# Patient Record
Sex: MALE | Race: WHITE | NOT HISPANIC OR LATINO | Employment: PART TIME | URBAN - METROPOLITAN AREA
[De-identification: names, ages, dates, MRNs, and addresses within clinical notes are randomized per-mention and may not be internally consistent; named-entity substitution may affect disease eponyms.]

---

## 2024-05-11 ENCOUNTER — HOSPITAL ENCOUNTER (EMERGENCY)
Facility: MEDICAL CENTER | Age: 19
End: 2024-05-11
Attending: EMERGENCY MEDICINE
Payer: COMMERCIAL

## 2024-05-11 ENCOUNTER — APPOINTMENT (OUTPATIENT)
Dept: RADIOLOGY | Facility: MEDICAL CENTER | Age: 19
End: 2024-05-11
Attending: EMERGENCY MEDICINE

## 2024-05-11 VITALS
TEMPERATURE: 97.2 F | SYSTOLIC BLOOD PRESSURE: 142 MMHG | WEIGHT: 253.09 LBS | RESPIRATION RATE: 18 BRPM | OXYGEN SATURATION: 98 % | DIASTOLIC BLOOD PRESSURE: 71 MMHG | HEART RATE: 76 BPM

## 2024-05-11 DIAGNOSIS — J06.9 UPPER RESPIRATORY TRACT INFECTION, UNSPECIFIED TYPE: ICD-10-CM

## 2024-05-11 DIAGNOSIS — R06.02 SOB (SHORTNESS OF BREATH): ICD-10-CM

## 2024-05-11 LAB
ALBUMIN SERPL BCP-MCNC: 4.5 G/DL (ref 3.2–4.9)
ALBUMIN/GLOB SERPL: 1.6 G/DL
ALP SERPL-CCNC: 78 U/L (ref 30–99)
ALT SERPL-CCNC: 34 U/L (ref 2–50)
ANION GAP SERPL CALC-SCNC: 13 MMOL/L (ref 7–16)
AST SERPL-CCNC: 23 U/L (ref 12–45)
BASOPHILS # BLD AUTO: 0.5 % (ref 0–1.8)
BASOPHILS # BLD: 0.04 K/UL (ref 0–0.12)
BILIRUB SERPL-MCNC: <0.2 MG/DL (ref 0.1–1.5)
BUN SERPL-MCNC: 10 MG/DL (ref 8–22)
CALCIUM ALBUM COR SERPL-MCNC: 9.5 MG/DL (ref 8.5–10.5)
CALCIUM SERPL-MCNC: 9.9 MG/DL (ref 8.5–10.5)
CHLORIDE SERPL-SCNC: 106 MMOL/L (ref 96–112)
CO2 SERPL-SCNC: 21 MMOL/L (ref 20–33)
CREAT SERPL-MCNC: 0.58 MG/DL (ref 0.5–1.4)
D DIMER PPP IA.FEU-MCNC: <0.27 UG/ML (FEU) (ref 0–0.5)
EKG IMPRESSION: NORMAL
EOSINOPHIL # BLD AUTO: 0.09 K/UL (ref 0–0.51)
EOSINOPHIL NFR BLD: 1.1 % (ref 0–6.9)
ERYTHROCYTE [DISTWIDTH] IN BLOOD BY AUTOMATED COUNT: 39.5 FL (ref 35.9–50)
FLUAV RNA SPEC QL NAA+PROBE: NEGATIVE
FLUBV RNA SPEC QL NAA+PROBE: NEGATIVE
GFR SERPLBLD CREATININE-BSD FMLA CKD-EPI: 143 ML/MIN/1.73 M 2
GLOBULIN SER CALC-MCNC: 2.8 G/DL (ref 1.9–3.5)
GLUCOSE SERPL-MCNC: 106 MG/DL (ref 65–99)
HCT VFR BLD AUTO: 44.8 % (ref 42–52)
HGB BLD-MCNC: 15.1 G/DL (ref 14–18)
IMM GRANULOCYTES # BLD AUTO: 0.05 K/UL (ref 0–0.11)
IMM GRANULOCYTES NFR BLD AUTO: 0.6 % (ref 0–0.9)
LYMPHOCYTES # BLD AUTO: 2.42 K/UL (ref 1–4.8)
LYMPHOCYTES NFR BLD: 30.7 % (ref 22–41)
MAGNESIUM SERPL-MCNC: 1.7 MG/DL (ref 1.5–2.5)
MCH RBC QN AUTO: 30.1 PG (ref 27–33)
MCHC RBC AUTO-ENTMCNC: 33.7 G/DL (ref 32.3–36.5)
MCV RBC AUTO: 89.4 FL (ref 81.4–97.8)
MONOCYTES # BLD AUTO: 0.6 K/UL (ref 0–0.85)
MONOCYTES NFR BLD AUTO: 7.6 % (ref 0–13.4)
NEUTROPHILS # BLD AUTO: 4.67 K/UL (ref 1.82–7.42)
NEUTROPHILS NFR BLD: 59.5 % (ref 44–72)
NRBC # BLD AUTO: 0 K/UL
NRBC BLD-RTO: 0 /100 WBC (ref 0–0.2)
PLATELET # BLD AUTO: 338 K/UL (ref 164–446)
PMV BLD AUTO: 9.8 FL (ref 9–12.9)
POTASSIUM SERPL-SCNC: 4.1 MMOL/L (ref 3.6–5.5)
PROCALCITONIN SERPL-MCNC: <0.05 NG/ML
PROT SERPL-MCNC: 7.3 G/DL (ref 6–8.2)
RBC # BLD AUTO: 5.01 M/UL (ref 4.7–6.1)
RSV RNA SPEC QL NAA+PROBE: NEGATIVE
SARS-COV-2 RNA RESP QL NAA+PROBE: NOTDETECTED
SODIUM SERPL-SCNC: 140 MMOL/L (ref 135–145)
WBC # BLD AUTO: 7.9 K/UL (ref 4.8–10.8)

## 2024-05-11 PROCEDURE — 83735 ASSAY OF MAGNESIUM: CPT

## 2024-05-11 PROCEDURE — 85379 FIBRIN DEGRADATION QUANT: CPT

## 2024-05-11 PROCEDURE — 71045 X-RAY EXAM CHEST 1 VIEW: CPT

## 2024-05-11 PROCEDURE — 36415 COLL VENOUS BLD VENIPUNCTURE: CPT

## 2024-05-11 PROCEDURE — 93005 ELECTROCARDIOGRAM TRACING: CPT

## 2024-05-11 PROCEDURE — 93005 ELECTROCARDIOGRAM TRACING: CPT | Performed by: EMERGENCY MEDICINE

## 2024-05-11 PROCEDURE — 80053 COMPREHEN METABOLIC PANEL: CPT

## 2024-05-11 PROCEDURE — 0241U HCHG SARS-COV-2 COVID-19 NFCT DS RESP RNA 4 TRGT ED POC: CPT

## 2024-05-11 PROCEDURE — 85025 COMPLETE CBC W/AUTO DIFF WBC: CPT

## 2024-05-11 PROCEDURE — 99284 EMERGENCY DEPT VISIT MOD MDM: CPT

## 2024-05-11 PROCEDURE — 84145 PROCALCITONIN (PCT): CPT

## 2024-05-11 PROCEDURE — 700105 HCHG RX REV CODE 258: Performed by: EMERGENCY MEDICINE

## 2024-05-11 RX ORDER — SODIUM CHLORIDE, SODIUM LACTATE, POTASSIUM CHLORIDE, CALCIUM CHLORIDE 600; 310; 30; 20 MG/100ML; MG/100ML; MG/100ML; MG/100ML
1000 INJECTION, SOLUTION INTRAVENOUS ONCE
Status: COMPLETED | OUTPATIENT
Start: 2024-05-11 | End: 2024-05-11

## 2024-05-11 RX ADMIN — SODIUM CHLORIDE, POTASSIUM CHLORIDE, SODIUM LACTATE AND CALCIUM CHLORIDE 1000 ML: 600; 310; 30; 20 INJECTION, SOLUTION INTRAVENOUS at 17:09

## 2024-05-11 ASSESSMENT — PAIN DESCRIPTION - PAIN TYPE
TYPE: ACUTE PAIN
TYPE: ACUTE PAIN

## 2024-05-11 NOTE — ED NOTES
Patient Identifiers verified; patient ambulated to room with a steady gait; accompanied by family; charted the Patient for an Emergency Room Physician to see.

## 2024-05-11 NOTE — ED PROVIDER NOTES
"ED Provider Note    CHIEF COMPLAINT  Chief Complaint   Patient presents with    Flu Like Symptoms     X 1 month.  Pt reports he took covid test 4 weeks ago but was negative    Shortness of Breath     Pt reports he is experiencing worsening SOB and \"getting more dizzy\" x 1 week       EXTERNAL RECORDS REVIEWED  Other none available    HPI/ROS  LIMITATION TO HISTORY   Select: : None  OUTSIDE HISTORIAN(S):  none    Montana Bone is a 19 y.o. male who presents with cough and shortness of breath.  Patient reports initially his symptoms began around 1 month ago, seem to wax and wane but has seemed worse over the last week.  He reports cough has been productive of some yellow sputum, there is some occasional blood.  He does report some intermittent left-sided chest pain with a cough, no constant chest pain, and does not seem to be exertional or positional.  He reports no leg pain or swelling.  No fevers or chills.  He also reports some occasional nausea and diarrhea.  No abdominal pain, no urinary symptoms.  No recent travel.  No syncope, no headaches, no rashes    PAST MEDICAL HISTORY       SURGICAL HISTORY  patient denies any surgical history    FAMILY HISTORY  History reviewed. No pertinent family history.    SOCIAL HISTORY  Social History     Tobacco Use    Smoking status: Every Day     Types: Cigarettes    Smokeless tobacco: Not on file   Vaping Use    Vaping Use: Never used   Substance and Sexual Activity    Alcohol use: Never    Drug use: Never    Sexual activity: Not on file       CURRENT MEDICATIONS  Home Medications    **Home medications have not yet been reviewed for this encounter**         ALLERGIES  No Known Allergies    PHYSICAL EXAM  VITAL SIGNS: BP (!) 142/71   Pulse 76   Temp 36.2 °C (97.2 °F) (Temporal)   Resp 18   Wt 115 kg (253 lb 1.4 oz)   SpO2 98%      Pulse ox interpretation: I interpret this pulse ox as normal.  Constitutional: Alert in no apparent distress.  HENT: No signs of " trauma, Bilateral external ears normal, Nose normal.   Eyes: Pupils are equal and reactive, Conjunctiva normal, Non-icteric.   Neck: Normal range of motion, No tenderness, Supple, No stridor.   Cardiovascular: Regular rate and rhythm, no murmurs.   Thorax & Lungs: Normal breath sounds, No respiratory distress, No wheezing, left chest wall  Abdomen: Bowel sounds normal, Soft, No tenderness, No masses, No pulsatile masses. No peritoneal signs.  Skin: Warm, Dry, No erythema, No rash.   Back: No bony tenderness, No CVA tenderness.   Extremities: Intact distal pulses, No edema, No tenderness, No cyanosis,  Negative Debora's sign.   Musculoskeletal: Good range of motion in all major joints. No tenderness to palpation or major deformities noted.   Neurologic: Alert , Normal motor function, Normal sensory function, No focal deficits noted.   Psychiatric: Affect normal, Judgment normal, Mood normal.               EKG/LABS  Results for orders placed or performed during the hospital encounter of 05/11/24   CBC WITH DIFFERENTIAL   Result Value Ref Range    WBC 7.9 4.8 - 10.8 K/uL    RBC 5.01 4.70 - 6.10 M/uL    Hemoglobin 15.1 14.0 - 18.0 g/dL    Hematocrit 44.8 42.0 - 52.0 %    MCV 89.4 81.4 - 97.8 fL    MCH 30.1 27.0 - 33.0 pg    MCHC 33.7 32.3 - 36.5 g/dL    RDW 39.5 35.9 - 50.0 fL    Platelet Count 338 164 - 446 K/uL    MPV 9.8 9.0 - 12.9 fL    Neutrophils-Polys 59.50 44.00 - 72.00 %    Lymphocytes 30.70 22.00 - 41.00 %    Monocytes 7.60 0.00 - 13.40 %    Eosinophils 1.10 0.00 - 6.90 %    Basophils 0.50 0.00 - 1.80 %    Immature Granulocytes 0.60 0.00 - 0.90 %    Nucleated RBC 0.00 0.00 - 0.20 /100 WBC    Neutrophils (Absolute) 4.67 1.82 - 7.42 K/uL    Lymphs (Absolute) 2.42 1.00 - 4.80 K/uL    Monos (Absolute) 0.60 0.00 - 0.85 K/uL    Eos (Absolute) 0.09 0.00 - 0.51 K/uL    Baso (Absolute) 0.04 0.00 - 0.12 K/uL    Immature Granulocytes (abs) 0.05 0.00 - 0.11 K/uL    NRBC (Absolute) 0.00 K/uL   COMP METABOLIC PANEL   Result  Value Ref Range    Sodium 140 135 - 145 mmol/L    Potassium 4.1 3.6 - 5.5 mmol/L    Chloride 106 96 - 112 mmol/L    Co2 21 20 - 33 mmol/L    Anion Gap 13.0 7.0 - 16.0    Glucose 106 (H) 65 - 99 mg/dL    Bun 10 8 - 22 mg/dL    Creatinine 0.58 0.50 - 1.40 mg/dL    Calcium 9.9 8.5 - 10.5 mg/dL    Correct Calcium 9.5 8.5 - 10.5 mg/dL    AST(SGOT) 23 12 - 45 U/L    ALT(SGPT) 34 2 - 50 U/L    Alkaline Phosphatase 78 30 - 99 U/L    Total Bilirubin <0.2 0.1 - 1.5 mg/dL    Albumin 4.5 3.2 - 4.9 g/dL    Total Protein 7.3 6.0 - 8.2 g/dL    Globulin 2.8 1.9 - 3.5 g/dL    A-G Ratio 1.6 g/dL   D-DIMER   Result Value Ref Range    D-Dimer <0.27 0.00 - 0.50 ug/mL (FEU)   MAGNESIUM   Result Value Ref Range    Magnesium 1.7 1.5 - 2.5 mg/dL   PROCALCITONIN   Result Value Ref Range    Procalcitonin <0.05 <0.25 ng/mL   ESTIMATED GFR   Result Value Ref Range    GFR (CKD-EPI) 143 >60 mL/min/1.73 m 2   EKG   Result Value Ref Range    Report       [facility] Emergency Dept.    Test Date:  2024  Pt Name:    VESTA BOSWELL              Department: ER  MRN:        [id number]                      Room:  Gender:     Male                         Technician: [id number]  :        2005                   Requested By:ER TRIAGE PROTOCOL  Order #:    [id number]                    Reading MD: FEDERICO CASIANO MD    Measurements  Intervals                                Axis  Rate:       102                          P:          62  OK:         123                          QRS:        66  QRSD:       93                           T:          19  QT:         333  QTc:        434    Interpretive Statements  Sinus tachycardia  No previous ECG available for comparison  Electronically Signed On 2024 18:12:06 PDT by FEDERICO CASIANO MD     POC CoV-2, FLU A/B, RSV by PCR   Result Value Ref Range    POC Influenza A RNA, PCR Negative Negative    POC Influenza B RNA, PCR Negative Negative    POC RSV, by PCR Negative Negative    POC  SARS-CoV-2, PCR NotDetected        I have independently interpreted this EKG    RADIOLOGY/PROCEDURES   I have independently interpreted the diagnostic imaging associated with this visit and am waiting the final reading from the radiologist.   My preliminary interpretation is as follows: No infiltrate    Radiologist interpretation:  DX-CHEST-PORTABLE (1 VIEW)   Final Result      Negative single view of the chest.          COURSE & MEDICAL DECISION MAKING    ASSESSMENT, COURSE AND PLAN  Care Narrative: 4:32 PM  Patient is evaluated the bedside and chart is reviewed.  At this point differential includes viral syndrome electrolyte or metabolic derangement, renal insufficiency, pulmonary embolism, pneumonia.  Have ordered for diagnostic labs, ECG, x-ray, IV fluid    6:13 PM  Patient is reevaluated, he is well-appearing, feeling better after fluids, updated on all results and he is comfortable discharge      '  Hydration: Based on the patient's presentation of Acute Diarrhea the patient was given IV fluids. IV Hydration was used because oral hydration was not as rapid as required. Upon recheck following hydration, the patient was improved.          PROBLEMS MANAGED  # Upper respiratory infection.  Patient presenting with cough and congestion and this is most likely a viral process.  Has no focal pulmonary findings on exam or x-ray to suggest pneumonia.  No leukocytosis, negative procalcitonin.  He did have some chest pain which I think is likely from same source, his ECG is unremarkable, he has no comorbid medical conditions and given his age and nature of these symptoms it seems highly unlikely to be ACS.  With his pain I did consider potential pulmonary embolism, his D-dimer is negative suggesting against this.  There is no electrolyte or metabolic derangement.  Patient is well-appearing and comfortable discharge    DISPOSITION AND DISCUSSIONS      Barriers to care at this time, including but not limited to: Patient  does not have established PCP.     Decision tools and prescription drugs considered including, but not limited to: PERC rule patient was not low risk by PERC criteria with his tachycardia so D-dimer was obtained and this was negative .    The patient will return for new or worsening symptoms and is stable at the time of discharge.    The patient is referred to a primary physician for blood pressure management, diabetic screening, and for all other preventative health concerns.        DISPOSITION:  Patient will be discharged home in stable condition.    FOLLOW UP:  [address] [address]  [phone]          OUTPATIENT MEDICATIONS:  New Prescriptions    No medications on file         FINAL DIAGNOSIS  1. SOB (shortness of breath)    2. Upper respiratory tract infection, unspecified type           Electronically signed by: James Agrawal M.D., 5/11/2024 4:32 PM

## 2024-05-11 NOTE — Clinical Note
Montana Bone was seen and treated in our emergency department on 5/11/2024.  He may return to work on 05/13/2024.       If you have any questions or concerns, please don't hesitate to call.      James Agrawal M.D.

## 2024-05-12 NOTE — ED NOTES
Rounded on patient; fluids continue running appropriately, patient denies any discomfort with IV/LR bolus; patient understands POC and states no needs at this time.

## 2024-05-12 NOTE — ED NOTES
Discharge education provided by ERP. Discharge paperwork reviewed with patient. PIV on Left arm removed. All questions answered. All belongings with patient. Patient ambulated to lobby unassisted with steady gait.

## 2024-05-12 NOTE — DISCHARGE INSTRUCTIONS
Your test today showed no dangerous cause for your symptoms.  There are no findings of pneumonia, problems with your heart or blood clots.  Please ensure that you rest and stay adequately hydrated.  Please follow-up on resources for primary care and seek more immediate medical attention for any new or worsening chest pain, passing out, high fevers or other concerns

## 2024-05-14 ENCOUNTER — APPOINTMENT (OUTPATIENT)
Dept: RADIOLOGY | Facility: MEDICAL CENTER | Age: 19
End: 2024-05-14
Attending: EMERGENCY MEDICINE
Payer: COMMERCIAL

## 2024-05-14 ENCOUNTER — HOSPITAL ENCOUNTER (EMERGENCY)
Facility: MEDICAL CENTER | Age: 19
End: 2024-05-14
Attending: STUDENT IN AN ORGANIZED HEALTH CARE EDUCATION/TRAINING PROGRAM
Payer: COMMERCIAL

## 2024-05-14 VITALS
OXYGEN SATURATION: 97 % | SYSTOLIC BLOOD PRESSURE: 126 MMHG | DIASTOLIC BLOOD PRESSURE: 61 MMHG | TEMPERATURE: 98.2 F | WEIGHT: 249.12 LBS | RESPIRATION RATE: 16 BRPM | HEART RATE: 90 BPM

## 2024-05-14 DIAGNOSIS — R07.89 CHEST WALL PAIN: ICD-10-CM

## 2024-05-14 LAB
ALBUMIN SERPL BCP-MCNC: 5.4 G/DL (ref 3.2–4.9)
ALBUMIN/GLOB SERPL: 1.5 G/DL
ALP SERPL-CCNC: 90 U/L (ref 30–99)
ALT SERPL-CCNC: 37 U/L (ref 2–50)
ANION GAP SERPL CALC-SCNC: 16 MMOL/L (ref 7–16)
AST SERPL-CCNC: 30 U/L (ref 12–45)
BASOPHILS # BLD AUTO: 0.5 % (ref 0–1.8)
BASOPHILS # BLD: 0.05 K/UL (ref 0–0.12)
BILIRUB SERPL-MCNC: 0.4 MG/DL (ref 0.1–1.5)
BUN SERPL-MCNC: 10 MG/DL (ref 8–22)
CALCIUM ALBUM COR SERPL-MCNC: 9.2 MG/DL (ref 8.5–10.5)
CALCIUM SERPL-MCNC: 10.3 MG/DL (ref 8.5–10.5)
CHLORIDE SERPL-SCNC: 102 MMOL/L (ref 96–112)
CO2 SERPL-SCNC: 23 MMOL/L (ref 20–33)
CREAT SERPL-MCNC: 0.72 MG/DL (ref 0.5–1.4)
D DIMER PPP IA.FEU-MCNC: <0.27 UG/ML (FEU) (ref 0–0.5)
EKG IMPRESSION: NORMAL
EOSINOPHIL # BLD AUTO: 0.11 K/UL (ref 0–0.51)
EOSINOPHIL NFR BLD: 1.1 % (ref 0–6.9)
ERYTHROCYTE [DISTWIDTH] IN BLOOD BY AUTOMATED COUNT: 40.2 FL (ref 35.9–50)
FLUAV RNA SPEC QL NAA+PROBE: NEGATIVE
FLUBV RNA SPEC QL NAA+PROBE: NEGATIVE
GFR SERPLBLD CREATININE-BSD FMLA CKD-EPI: 134 ML/MIN/1.73 M 2
GLOBULIN SER CALC-MCNC: 3.5 G/DL (ref 1.9–3.5)
GLUCOSE SERPL-MCNC: 83 MG/DL (ref 65–99)
HCT VFR BLD AUTO: 49.8 % (ref 42–52)
HGB BLD-MCNC: 16.5 G/DL (ref 14–18)
IMM GRANULOCYTES # BLD AUTO: 0.08 K/UL (ref 0–0.11)
IMM GRANULOCYTES NFR BLD AUTO: 0.8 % (ref 0–0.9)
LYMPHOCYTES # BLD AUTO: 3.08 K/UL (ref 1–4.8)
LYMPHOCYTES NFR BLD: 30 % (ref 22–41)
MCH RBC QN AUTO: 29.8 PG (ref 27–33)
MCHC RBC AUTO-ENTMCNC: 33.1 G/DL (ref 32.3–36.5)
MCV RBC AUTO: 90.1 FL (ref 81.4–97.8)
MONOCYTES # BLD AUTO: 0.6 K/UL (ref 0–0.85)
MONOCYTES NFR BLD AUTO: 5.8 % (ref 0–13.4)
NEUTROPHILS # BLD AUTO: 6.34 K/UL (ref 1.82–7.42)
NEUTROPHILS NFR BLD: 61.8 % (ref 44–72)
NRBC # BLD AUTO: 0 K/UL
NRBC BLD-RTO: 0 /100 WBC (ref 0–0.2)
PLATELET # BLD AUTO: 354 K/UL (ref 164–446)
PMV BLD AUTO: 9.5 FL (ref 9–12.9)
POTASSIUM SERPL-SCNC: 4.1 MMOL/L (ref 3.6–5.5)
PROT SERPL-MCNC: 8.9 G/DL (ref 6–8.2)
RBC # BLD AUTO: 5.53 M/UL (ref 4.7–6.1)
RSV RNA SPEC QL NAA+PROBE: NEGATIVE
SARS-COV-2 RNA RESP QL NAA+PROBE: NOTDETECTED
SODIUM SERPL-SCNC: 141 MMOL/L (ref 135–145)
TROPONIN T SERPL-MCNC: <6 NG/L (ref 6–19)
WBC # BLD AUTO: 10.3 K/UL (ref 4.8–10.8)

## 2024-05-14 RX ORDER — ACETAMINOPHEN 325 MG/1
650 TABLET ORAL ONCE
Status: COMPLETED | OUTPATIENT
Start: 2024-05-14 | End: 2024-05-14

## 2024-05-14 RX ORDER — IBUPROFEN 600 MG/1
600 TABLET ORAL ONCE
Status: COMPLETED | OUTPATIENT
Start: 2024-05-14 | End: 2024-05-14

## 2024-05-14 RX ADMIN — ACETAMINOPHEN 650 MG: 325 TABLET, FILM COATED ORAL at 18:23

## 2024-05-14 RX ADMIN — IBUPROFEN 600 MG: 600 TABLET, FILM COATED ORAL at 18:24

## 2024-05-14 ASSESSMENT — FIBROSIS 4 INDEX: FIB4 SCORE: 0.23

## 2024-05-14 ASSESSMENT — LIFESTYLE VARIABLES: DO YOU DRINK ALCOHOL: NO

## 2024-05-14 ASSESSMENT — PAIN DESCRIPTION - PAIN TYPE: TYPE: ACUTE PAIN

## 2024-05-14 NOTE — ED NOTES
Pt ambulated to [address] with a steady gait. C/C is Chest Wall Pain. Pt is in a gown, on the monitor and call light within reach. Chart up for ERP. Friend of pt at bedside.

## 2024-05-14 NOTE — ED TRIAGE NOTES
"Pt ambulatory to triage c/o cough and chest wall pain since last night. Pt states he was seen recently for same and told \"everything looked good\" pt denies fever. nad  "

## 2024-05-15 NOTE — ED NOTES
Patient given discharge instructions/, home care instructions explained, patient verbalized understanding of instructions given/patient understands importance of follow up, patient ambulatory to ER [facility].

## 2024-05-15 NOTE — ED PROVIDER NOTES
ED Provider Note    CHIEF COMPLAINT  Chief Complaint   Patient presents with    Chest Wall Pain       EXTERNAL RECORDS REVIEWED  Relevant external notes available    HPI/ROS  LIMITATION TO HISTORY   Select: : None  OUTSIDE HISTORIAN(S):  None    Montana Cresencio Bone is a 19 y.o. male with no reported past medical history presenting to the emergency department for evaluation of left-sided chest pain.  Patient says that last night he developed left-sided chest discomfort, sharp in nature, worse with taking a deep breath.  Says that symptoms resolved spontaneously for approximately 2 hours and then recurred.  Chest pain is improved now but he still has some residual left-sided chest discomfort.  No radiation to the back, neck, jaw.  No associated nausea or vomiting.  He does have a cough, says that he has noticed a small amount of blood in his sputum.  He has had chills.  No known sick contacts.    PAST MEDICAL HISTORY       SURGICAL HISTORY  patient denies any surgical history    FAMILY HISTORY  History reviewed. No pertinent family history.    SOCIAL HISTORY  Social History     Tobacco Use    Smoking status: Every Day     Types: Cigarettes    Smokeless tobacco: Never   Vaping Use    Vaping Use: Never used   Substance and Sexual Activity    Alcohol use: Never    Drug use: Never    Sexual activity: Not on file       CURRENT MEDICATIONS  Home Medications       Reviewed by Tangela Murphy R.N. (Registered Nurse) on 05/14/24 at 1532  Med List Status: Partial     Medication Last Dose Status        Patient Kendrick Taking any Medications                           ALLERGIES  No Known Allergies    PHYSICAL EXAM  VITAL SIGNS: /61   Pulse 90   Temp 36.8 °C (98.2 °F) (Temporal)   Resp 16   Wt 113 kg (249 lb 1.9 oz)   SpO2 97%    General: Well- appearing , non-toxic, no acute distress  Neuro: oriented x 3, moving all extremities.   HEENT:   - Head: Normocephalic, atraumatic  - Eyes: PERRL  - Ears/Nose: normal  external nose and ears  - Mouth: moist mucosal membranes  Resp: clear to auscultation, no increased work of breathing  Chest: No tenderness palpation.  No rash.  CV: Regular rate and rhythm  Abd: Soft, non-tender, non-distended  Extremities: No peripheral edema  Psych: lucid and conversational         DIAGNOSTIC STUDIES / PROCEDURES    EKG  My independent EKG interpretation:  Results for orders placed or performed during the hospital encounter of 24   EKG (NOW)   Result Value Ref Range    Report       [facility] Emergency Dept.    Test Date:  2024  Pt Name:    VESTA BOSWELL              Department: ER  MRN:        [id number]                      Room:       [facility]  Gender:     Male                         Technician: [id number]  :        2005                   Requested By:THONG GALE  Order #:    [id number]                    Reading MD: Thong Gale    Measurements  Intervals                                Axis  Rate:       85                           P:          49  ME:         142                          QRS:        62  QRSD:       91                           T:          33  QT:         379  QTc:        451    Interpretive Statements  Sinus rhythm  ST elev, probable normal early repol pattern  Compared to ECG 2024 16:09:04  No acute ST or T changes  Electronically Signed On 2024 23:33:19 PDT by Thong Gale         LABS  Results for orders placed or performed during the hospital encounter of 24   CBC WITH DIFFERENTIAL   Result Value Ref Range    WBC 10.3 4.8 - 10.8 K/uL    RBC 5.53 4.70 - 6.10 M/uL    Hemoglobin 16.5 14.0 - 18.0 g/dL    Hematocrit 49.8 42.0 - 52.0 %    MCV 90.1 81.4 - 97.8 fL    MCH 29.8 27.0 - 33.0 pg    MCHC 33.1 32.3 - 36.5 g/dL    RDW 40.2 35.9 - 50.0 fL    Platelet Count 354 164 - 446 K/uL    MPV 9.5 9.0 - 12.9 fL    Neutrophils-Polys 61.80 44.00 - 72.00 %    Lymphocytes 30.00 22.00 - 41.00 %    Monocytes 5.80 0.00 - 13.40 %     Eosinophils 1.10 0.00 - 6.90 %    Basophils 0.50 0.00 - 1.80 %    Immature Granulocytes 0.80 0.00 - 0.90 %    Nucleated RBC 0.00 0.00 - 0.20 /100 WBC    Neutrophils (Absolute) 6.34 1.82 - 7.42 K/uL    Lymphs (Absolute) 3.08 1.00 - 4.80 K/uL    Monos (Absolute) 0.60 0.00 - 0.85 K/uL    Eos (Absolute) 0.11 0.00 - 0.51 K/uL    Baso (Absolute) 0.05 0.00 - 0.12 K/uL    Immature Granulocytes (abs) 0.08 0.00 - 0.11 K/uL    NRBC (Absolute) 0.00 K/uL   COMP METABOLIC PANEL   Result Value Ref Range    Sodium 141 135 - 145 mmol/L    Potassium 4.1 3.6 - 5.5 mmol/L    Chloride 102 96 - 112 mmol/L    Co2 23 20 - 33 mmol/L    Anion Gap 16.0 7.0 - 16.0    Glucose 83 65 - 99 mg/dL    Bun 10 8 - 22 mg/dL    Creatinine 0.72 0.50 - 1.40 mg/dL    Calcium 10.3 8.5 - 10.5 mg/dL    Correct Calcium 9.2 8.5 - 10.5 mg/dL    AST(SGOT) 30 12 - 45 U/L    ALT(SGPT) 37 2 - 50 U/L    Alkaline Phosphatase 90 30 - 99 U/L    Total Bilirubin 0.4 0.1 - 1.5 mg/dL    Albumin 5.4 (H) 3.2 - 4.9 g/dL    Total Protein 8.9 (H) 6.0 - 8.2 g/dL    Globulin 3.5 1.9 - 3.5 g/dL    A-G Ratio 1.5 g/dL   TROPONIN   Result Value Ref Range    Troponin T <6 6 - 19 ng/L   D-DIMER   Result Value Ref Range    D-Dimer <0.27 0.00 - 0.50 ug/mL (FEU)   ESTIMATED GFR   Result Value Ref Range    GFR (CKD-EPI) 134 >60 mL/min/1.73 m 2   EKG (NOW)   Result Value Ref Range    Report       [facility] Emergency Dept.    Test Date:  2024  Pt Name:    VESTA BOSWELL              Department: ER  MRN:        [id number]                      Room:       [facility]  Gender:     Male                         Technician: [id number]  :        2005                   Requested By:THONG GALE  Order #:    [id number]                    Reading MD: Thong Gale    Measurements  Intervals                                Axis  Rate:       85                           P:          49  AR:         142                          QRS:        62  QRSD:       91                            T:          33  QT:         379  QTc:        451    Interpretive Statements  Sinus rhythm  ST elev, probable normal early repol pattern  Compared to ECG 05/11/2024 16:09:04  No acute ST or T changes  Electronically Signed On 05- 23:33:19 PDT by Thong Gale     POC CoV-2, FLU A/B, RSV by PCR   Result Value Ref Range    POC Influenza A RNA, PCR Negative Negative    POC Influenza B RNA, PCR Negative Negative    POC RSV, by PCR Negative Negative    POC SARS-CoV-2, PCR NotDetected        RADIOLOGY  I have independently interpreted the diagnostic imaging associated with this visit and am waiting the final reading from the radiologist.   My preliminary interpretation is as follows:   - Plain film of the chest shows no evidence of acute cardiopulmonary abnormality  Radiologist interpretation:   DX-CHEST-2 VIEWS   Final Result      Negative single view of the chest.              MEDICAL DECISION MAKING      ED COURSE AND PLAN    Montana Bone is a 19 y.o. male presenting to the emergency department for evaluation of chest discomfort that has been present since yesterday evening.  He has associated cough and chills.  He says that he has had blood-streaked sputum.  Tachycardic on arrival to the emergency department but oxygen saturation is normal.  Will assess for ACS with EKG and troponin.  Chest x-ray obtained shows no obvious infiltrate.  Felt low risk for acute pulmonary embolism but given tachycardia and report of scant mopped assist we will obtain a D-dimer.    ---Pertinent ED Course---:    5:00 PM I reviewed the patient's old records in Epic, medication list, allergies, past medical history and performed a physical examination.     5:45 PM chest x-ray reviewed shows no evidence of acute infiltrate.  COVID flu RSV is negative.  EKG is nonischemic.  6:30 PM labs returned, troponin is negative, labs are unremarkable.  D-dimer is negative, no indication for CT pulm angiogram.  Symptoms seem most consistent with  viral upper respiratory infection.  No indication for repeat troponin, heart score is 0.  No indication for further diagnostic workup.  Is appropriate for discharge, return precautions discussed.        Chest Pain: Heart score 0    Procedures:      ----------------------------------------------------------------------------------  DISCUSSIONS    I have discussed management of the patient with the following physicians and ELA's:      Discussion of management with other [facility] or appropriate source(s):     Escalation of care considered, and ultimately not performed: Consider CT pulmonary angiogram, see MDM above    Barriers to care at this time, including but not limited to:     Decision tools and prescription drugs considered including, but not limited to: Considered but no indication for antibiotics.    FINAL IMPRESSION    1. Chest wall pain        There are no discharge medications for this patient.        DISPOSITION    Discharge home, Stable          This chart was dictated using an electronic voice recognition software. The chart has been reviewed and edited but there is still possibility for dictation errors due to limitation of software.    Thong Gale,  5/14/2024

## 2024-05-15 NOTE — DISCHARGE INSTRUCTIONS
You were seen in the emergency department for symptoms consistent with a viral upper respiratory infection.  Labs, EKG, chest x-ray were reassuring.    Please take Tylenol 1000 mg and ibuprofen 400 mg every 6 hours to help with your symptoms unless you have a contraindication to one of these medications.    Keep yourself well-hydrated.    Keep a mask on while you are around other people and avoid contact with other people until your symptoms are improving and you have not had a fever for approximately 24 hours.    If you develop worsening chest pain or shortness of breath, come back to the emergency department for reevaluation.

## 2024-07-06 ENCOUNTER — HOSPITAL ENCOUNTER (EMERGENCY)
Facility: MEDICAL CENTER | Age: 19
End: 2024-07-06
Attending: EMERGENCY MEDICINE
Payer: COMMERCIAL

## 2024-07-06 VITALS
TEMPERATURE: 97.7 F | DIASTOLIC BLOOD PRESSURE: 72 MMHG | SYSTOLIC BLOOD PRESSURE: 123 MMHG | WEIGHT: 258.6 LBS | HEIGHT: 75 IN | BODY MASS INDEX: 32.15 KG/M2 | HEART RATE: 77 BPM | OXYGEN SATURATION: 96 % | RESPIRATION RATE: 18 BRPM

## 2024-07-06 DIAGNOSIS — U07.1 COVID-19: ICD-10-CM

## 2024-07-06 DIAGNOSIS — R05.1 ACUTE COUGH: ICD-10-CM

## 2024-07-06 DIAGNOSIS — R52 BODY ACHES: ICD-10-CM

## 2024-07-06 LAB
EKG IMPRESSION: NORMAL
FLUAV RNA SPEC QL NAA+PROBE: NEGATIVE
FLUBV RNA SPEC QL NAA+PROBE: NEGATIVE
RSV RNA SPEC QL NAA+PROBE: NEGATIVE
SARS-COV-2 RNA RESP QL NAA+PROBE: DETECTED

## 2024-07-06 PROCEDURE — 99283 EMERGENCY DEPT VISIT LOW MDM: CPT

## 2024-07-06 PROCEDURE — 93005 ELECTROCARDIOGRAM TRACING: CPT | Performed by: EMERGENCY MEDICINE

## 2024-07-06 PROCEDURE — 0241U HCHG SARS-COV-2 COVID-19 NFCT DS RESP RNA 4 TRGT ED POC: CPT

## 2024-07-06 PROCEDURE — 93005 ELECTROCARDIOGRAM TRACING: CPT

## 2024-07-06 RX ORDER — ALBUTEROL SULFATE 90 UG/1
2 AEROSOL, METERED RESPIRATORY (INHALATION) EVERY 6 HOURS PRN
Qty: 8.5 G | Refills: 1 | Status: SHIPPED | OUTPATIENT
Start: 2024-07-06

## 2024-07-06 RX ORDER — ALBUTEROL SULFATE 90 UG/1
2 AEROSOL, METERED RESPIRATORY (INHALATION) EVERY 6 HOURS PRN
Qty: 8.5 G | Refills: 0 | Status: SHIPPED | OUTPATIENT
Start: 2024-07-06 | End: 2024-07-06

## 2024-07-06 ASSESSMENT — PAIN DESCRIPTION - PAIN TYPE
TYPE: ACUTE PAIN
TYPE: ACUTE PAIN

## 2024-07-06 ASSESSMENT — FIBROSIS 4 INDEX: FIB4 SCORE: 0.28

## 2024-07-08 ENCOUNTER — APPOINTMENT (OUTPATIENT)
Dept: RADIOLOGY | Facility: MEDICAL CENTER | Age: 19
End: 2024-07-08
Attending: EMERGENCY MEDICINE
Payer: COMMERCIAL

## 2024-07-08 ENCOUNTER — HOSPITAL ENCOUNTER (EMERGENCY)
Facility: MEDICAL CENTER | Age: 19
End: 2024-07-08
Attending: EMERGENCY MEDICINE
Payer: COMMERCIAL

## 2024-07-08 VITALS
HEART RATE: 92 BPM | OXYGEN SATURATION: 97 % | WEIGHT: 253.97 LBS | SYSTOLIC BLOOD PRESSURE: 135 MMHG | RESPIRATION RATE: 16 BRPM | DIASTOLIC BLOOD PRESSURE: 60 MMHG | BODY MASS INDEX: 31.58 KG/M2 | HEIGHT: 75 IN | TEMPERATURE: 97.9 F

## 2024-07-08 DIAGNOSIS — B34.9 VIRAL SYNDROME: ICD-10-CM

## 2024-07-08 DIAGNOSIS — U07.1 COVID: ICD-10-CM

## 2024-07-08 PROCEDURE — 71045 X-RAY EXAM CHEST 1 VIEW: CPT

## 2024-07-08 PROCEDURE — 99283 EMERGENCY DEPT VISIT LOW MDM: CPT

## 2024-07-08 ASSESSMENT — FIBROSIS 4 INDEX: FIB4 SCORE: 0.28

## 2024-07-08 ASSESSMENT — LIFESTYLE VARIABLES
HAVE YOU EVER FELT YOU SHOULD CUT DOWN ON YOUR DRINKING: NO
TOTAL SCORE: 0
CONSUMPTION TOTAL: INCOMPLETE
DO YOU DRINK ALCOHOL: NO
HAVE PEOPLE ANNOYED YOU BY CRITICIZING YOUR DRINKING: NO
EVER FELT BAD OR GUILTY ABOUT YOUR DRINKING: NO
EVER HAD A DRINK FIRST THING IN THE MORNING TO STEADY YOUR NERVES TO GET RID OF A HANGOVER: NO
TOTAL SCORE: 0
TOTAL SCORE: 0

## 2024-07-12 ENCOUNTER — OFFICE VISIT (OUTPATIENT)
Dept: URGENT CARE | Facility: CLINIC | Age: 19
End: 2024-07-12
Payer: COMMERCIAL

## 2024-07-12 VITALS
HEIGHT: 75 IN | DIASTOLIC BLOOD PRESSURE: 80 MMHG | SYSTOLIC BLOOD PRESSURE: 122 MMHG | TEMPERATURE: 97.8 F | WEIGHT: 252 LBS | HEART RATE: 85 BPM | BODY MASS INDEX: 31.33 KG/M2 | RESPIRATION RATE: 14 BRPM | OXYGEN SATURATION: 95 %

## 2024-07-12 DIAGNOSIS — J98.8 RESPIRATORY TRACT INFECTION DUE TO COVID-19 VIRUS: ICD-10-CM

## 2024-07-12 DIAGNOSIS — U07.1 RESPIRATORY TRACT INFECTION DUE TO COVID-19 VIRUS: ICD-10-CM

## 2024-07-12 DIAGNOSIS — J45.901 EXACERBATION OF ASTHMA, UNSPECIFIED ASTHMA SEVERITY, UNSPECIFIED WHETHER PERSISTENT: ICD-10-CM

## 2024-07-12 LAB
FLUAV RNA SPEC QL NAA+PROBE: NEGATIVE
FLUBV RNA SPEC QL NAA+PROBE: NEGATIVE
RSV RNA SPEC QL NAA+PROBE: NEGATIVE
SARS-COV-2 RNA RESP QL NAA+PROBE: POSITIVE

## 2024-07-12 PROCEDURE — 99204 OFFICE O/P NEW MOD 45 MIN: CPT | Performed by: FAMILY MEDICINE

## 2024-07-12 PROCEDURE — 3079F DIAST BP 80-89 MM HG: CPT | Performed by: FAMILY MEDICINE

## 2024-07-12 PROCEDURE — 0241U POCT CEPHEID COV-2, FLU A/B, RSV - PCR: CPT | Performed by: FAMILY MEDICINE

## 2024-07-12 PROCEDURE — 3074F SYST BP LT 130 MM HG: CPT | Performed by: FAMILY MEDICINE

## 2024-07-12 RX ORDER — BENZONATATE 200 MG/1
200 CAPSULE ORAL 3 TIMES DAILY PRN
Qty: 30 CAPSULE | Refills: 0 | Status: SHIPPED | OUTPATIENT
Start: 2024-07-12

## 2024-07-12 RX ORDER — PREDNISONE 20 MG/1
40 TABLET ORAL EVERY MORNING
Qty: 6 TABLET | Refills: 0 | Status: SHIPPED | OUTPATIENT
Start: 2024-07-12 | End: 2024-07-15

## 2024-07-12 ASSESSMENT — ENCOUNTER SYMPTOMS
MYALGIAS: 1
COUGH: 1

## 2024-07-12 ASSESSMENT — FIBROSIS 4 INDEX: FIB4 SCORE: 0.28

## 2024-09-15 ENCOUNTER — HOSPITAL ENCOUNTER (EMERGENCY)
Facility: MEDICAL CENTER | Age: 19
End: 2024-09-16
Attending: STUDENT IN AN ORGANIZED HEALTH CARE EDUCATION/TRAINING PROGRAM
Payer: COMMERCIAL

## 2024-09-15 DIAGNOSIS — M79.672 FOOT PAIN, BILATERAL: ICD-10-CM

## 2024-09-15 DIAGNOSIS — M79.671 FOOT PAIN, BILATERAL: ICD-10-CM

## 2024-09-15 DIAGNOSIS — Z71.6 ENCOUNTER FOR COUNSELING FOR TOBACCO USE DISORDER: ICD-10-CM

## 2024-09-15 DIAGNOSIS — L84 FOOT CALLUS: ICD-10-CM

## 2024-09-15 PROCEDURE — 99283 EMERGENCY DEPT VISIT LOW MDM: CPT

## 2024-09-15 ASSESSMENT — FIBROSIS 4 INDEX: FIB4 SCORE: 0.28

## 2024-09-16 VITALS
HEART RATE: 86 BPM | DIASTOLIC BLOOD PRESSURE: 58 MMHG | WEIGHT: 230 LBS | OXYGEN SATURATION: 94 % | SYSTOLIC BLOOD PRESSURE: 125 MMHG | TEMPERATURE: 97 F | BODY MASS INDEX: 28.6 KG/M2 | RESPIRATION RATE: 16 BRPM | HEIGHT: 75 IN

## 2024-09-16 LAB — GLUCOSE BLD STRIP.AUTO-MCNC: 75 MG/DL (ref 65–99)

## 2024-09-16 PROCEDURE — 82962 GLUCOSE BLOOD TEST: CPT

## 2024-09-16 PROCEDURE — A9270 NON-COVERED ITEM OR SERVICE: HCPCS | Performed by: STUDENT IN AN ORGANIZED HEALTH CARE EDUCATION/TRAINING PROGRAM

## 2024-09-16 PROCEDURE — 700102 HCHG RX REV CODE 250 W/ 637 OVERRIDE(OP): Performed by: STUDENT IN AN ORGANIZED HEALTH CARE EDUCATION/TRAINING PROGRAM

## 2024-09-16 RX ORDER — IBUPROFEN 600 MG/1
600 TABLET, FILM COATED ORAL ONCE
Status: COMPLETED | OUTPATIENT
Start: 2024-09-16 | End: 2024-09-16

## 2024-09-16 RX ADMIN — IBUPROFEN 600 MG: 600 TABLET, FILM COATED ORAL at 00:30

## 2024-09-16 NOTE — ED TRIAGE NOTES
Chief Complaint   Patient presents with    Foot Pain     Bilateral foot pain x2 weeks. Pt states he has deep calluses to the bottom of both feet, increasing in pain tonight. +numbness and tingling. CMS intact. 8/10 pain. Pt reports pain is worse after relieving pressure from his feet.       Patient wheeled to triage for above complaint. Patient A&Ox4, GCS 15, patient speaking in full sentences. Equal and unlabored respirations. Patient educated on triage process and encouraged to notify staff if condition worsens. Appropriate protocols ordered. Patient returned to the lobby in stable condition.

## 2024-09-16 NOTE — ED NOTES
Pt discharged to home. Pt was given follow up instructions. Pt verbalized understanding of all instructions for discharge and is assisted out of ED with by home wheelchair. AOx4

## 2024-09-16 NOTE — ED PROVIDER NOTES
CHIEF COMPLAINT  Chief Complaint   Patient presents with    Foot Pain     Bilateral foot pain x2 weeks. Pt states he has deep calluses to the bottom of both feet, increasing in pain tonight. +numbness and tingling. CMS intact. 8/10 pain. Pt reports pain is worse after relieving pressure from his feet.        LIMITATION TO HISTORY   Select:     HPI    Montana Bone is a 19 y.o. male who presents to the Emergency Department for evaluation bilateral foot pain for the last 2 weeks patient reports he place his feet every day but does not wash his socks and sometimes he reports of burning sensation and numbness in the bottom of his feet    OUTSIDE HISTORIAN(S):  Select:    EXTERNAL RECORDS REVIEWED  Select:       PAST MEDICAL HISTORY  Past Medical History:   Diagnosis Date    ADHD      .    SURGICAL HISTORY  History reviewed. No pertinent surgical history.      FAMILY HISTORY  History reviewed. No pertinent family history.       SOCIAL HISTORY  Social History     Socioeconomic History    Marital status: Single     Spouse name: Not on file    Number of children: Not on file    Years of education: Not on file    Highest education level: Not on file   Occupational History    Not on file   Tobacco Use    Smoking status: Every Day     Types: Cigarettes    Smokeless tobacco: Never   Vaping Use    Vaping status: Every Day   Substance and Sexual Activity    Alcohol use: Yes     Comment: occ.    Drug use: Never    Sexual activity: Not on file   Other Topics Concern    Not on file   Social History Narrative    Not on file     Social Determinants of Health     Financial Resource Strain: Not on file   Food Insecurity: Not on file   Transportation Needs: Not on file   Physical Activity: Not on file   Stress: Not on file   Social Connections: Not on file   Intimate Partner Violence: Not on file   Housing Stability: Not on file         CURRENT MEDICATIONS  No current facility-administered medications on file prior to  "encounter.     Current Outpatient Medications on File Prior to Encounter   Medication Sig Dispense Refill    benzonatate (TESSALON) 200 MG capsule Take 1 Capsule by mouth 3 times a day as needed for Cough. 30 Capsule 0    albuterol 108 (90 Base) MCG/ACT Aero Soln inhalation aerosol Inhale 2 Puffs by mouth every 6 hours as needed for Shortness of Breath. 8.5 g 1           ALLERGIES  No Known Allergies    PHYSICAL EXAM  VITAL SIGNS:/58   Pulse 86   Temp 36.1 °C (97 °F) (Temporal)   Resp 16   Ht 1.905 m (6' 3\")   Wt 104 kg (230 lb)   SpO2 94%   BMI 28.75 kg/m²       GENERAL: Awake and alert  HEAD: Normocephalic and atraumatic  NECK: Normal range of motion  EYES: PERRL, + EOMI, conjunctiva white  ENT: Mucous membranes moist, oropharynx clear  PULMONARY: Normal effort  CARDIOVASCULAR: Normal rate, peripheral pulses 2+ dorsal pedal pulse  ABDOMEN: Soft  BACK: normal to inspection  NEUROLOGICAL: Grossly non-focal neurological examination, speech normal, gait normal  EXTREMITIES: No edema, no signs of extremity trauma  SKIN: Warm and dry calluses present on the bottom of both feet no erythema warmth tenderness sensation intact  PSYCHIATRIC: Affect is appropriate      DIAGNOSTIC STUDIES / PROCEDURES  EKG  Counseling on tobacco cessation:  I spent 5 minutes disccusing with the patient tobacco cessation and treatment options.    COURSE & MEDICAL DECISION MAKING    ED COURSE:        INTERVENTIONS BY ME:  Medications   ibuprofen (Motrin) tablet 600 mg (600 mg Oral Given 9/16/24 0030)       Response on recheck:  Blood sugar is normal provided to clean socks    INITIAL ASSESSMENT, COURSE AND PLAN  Care Narrative:   The patient is a 19-year-old male presenting with bilateral foot pain for two weeks, accompanied by numbness and tingling. He describes having calluses on the soles of his feet, with worsening pain, particularly after relieving pressure from the feet. The patient also admits to poor foot hygiene, wearing " the same socks daily without washing them. His physical exam reveals intact circulation, motor, and sensation (CMS), with calluses present but no signs of infection, edema, or trauma. Neurologically, he is non-focal, and the exam is otherwise unremarkable.    Given the presentation, the differential diagnosis for this patient includes:    Peripheral neuropathy (possibly related to nerve compression from the calluses or prolonged improper footwear),  Diabetic neuropathy (though blood sugar is normal, it still warrants consideration for further follow-up given the neuropathic symptoms),  Infection (though less likely given the lack of erythema or warmth, a soft tissue infection such as cellulitis or abscess should be monitored).  Other considerations could include a mechanical cause of the pain due to improper footwear or hygiene, leading to callus formation and irritation of underlying nerves. While the patient does not present with signs of systemic infection, chronic poor hygiene increases the risk of bacterial or fungal infection, which should be monitored if symptoms worsen or signs of infection emerge.    The patient has no acute life-threatening condition, such as acute limb ischemia, deep vein thrombosis, or compartment syndrome. The focus of management will be on pain control (ibuprofen 600 mg), education on proper foot hygiene, and counseling regarding tobacco cessation. The patient should also be advised to wear clean socks and shoes with proper support to prevent further irritation of the calluses. If symptoms of infection arise, he should return for further evaluation.    Discharge instructions will include continued use of nonsteroidal anti-inflammatory drugs (NSAIDs) for pain, avoidance of tight footwear, and maintenance of good foot hygiene. The patient should return to the emergency department if there are signs of infection, such as increasing redness, warmth, swelling, or worsening pain despite  treatment.           ADDITIONAL PROBLEM LIST    DISPOSITION AND DISCUSSIONS  Discussion of management with other QHP or appropriate source(s): None       Escalation of care considered, and ultimately not performed:Laboratory analysis and diagnostic imaging    Decision tools and prescription drugs considered including, but not limited to: Discussed utility of antibiotics.    FINAL DIAGNOSIS  1. Foot callus    2. Foot pain, bilateral    3. Encounter for counseling for tobacco use disorder             Electronically signed by: Rasta Artis DO ,12:50 AM 09/16/24

## 2024-09-18 ENCOUNTER — DOCUMENTATION (OUTPATIENT)
Dept: MEDICAL GROUP | Facility: CLINIC | Age: 19
End: 2024-09-18
Payer: COMMERCIAL

## 2024-09-18 NOTE — PROGRESS NOTES
"[facility]:    Pt presents for foot pain.  Went to ER 3 days ago and is concerned b/c he thought he understood that the ER provider said he had diabetes and he was going to lose his feet.  On chart review, diabetic neuropathy was in the differential but presentation was most likely d/t poor hygiene.   Pt has 2 sores on dorsal side of big toes from walking in the same sliders.  Pt states he is a size 14 shoe and can't find any other shoes.  Pt presents in a wheelchair that he has been using for the last week d/t foot pain.    Pt has hx of being a  with injuries and \"4 rods in his upper legs\", he states since then he has had numbness in his legs and difficulty walking.      A/P  Sores on feet d/t rubbing of shoes:  Cleaned and dressed - instructions given for care.  No signs of infection  Instructed pt to use resources to get another pair of shoes to offload the pressure spots and avoid walking in these sliders    Numbness in LEs and dis coordination since bull riding injuries:  Pt appears somewhat unstable upon standing from wheelchair but had not been wheelchair bound prior to a week ago and had been walking long distances in sliders.    Pt amenable to establishing with PCP - resources for [facility], [facility] and [facility] given to pt  Explained to pt that PT will likely help these sequela long-term.      "

## 2024-12-15 ENCOUNTER — APPOINTMENT (OUTPATIENT)
Dept: RADIOLOGY | Facility: MEDICAL CENTER | Age: 19
End: 2024-12-15
Attending: EMERGENCY MEDICINE

## 2024-12-15 ENCOUNTER — HOSPITAL ENCOUNTER (EMERGENCY)
Facility: MEDICAL CENTER | Age: 19
End: 2024-12-15
Attending: EMERGENCY MEDICINE

## 2024-12-15 VITALS
WEIGHT: 227.96 LBS | BODY MASS INDEX: 28.34 KG/M2 | TEMPERATURE: 97.4 F | OXYGEN SATURATION: 95 % | HEIGHT: 75 IN | DIASTOLIC BLOOD PRESSURE: 58 MMHG | SYSTOLIC BLOOD PRESSURE: 119 MMHG | RESPIRATION RATE: 16 BRPM | HEART RATE: 86 BPM

## 2024-12-15 DIAGNOSIS — R06.00 DYSPNEA, UNSPECIFIED TYPE: ICD-10-CM

## 2024-12-15 LAB
ALBUMIN SERPL BCP-MCNC: 4.1 G/DL (ref 3.2–4.9)
ALBUMIN/GLOB SERPL: 1.2 G/DL
ALP SERPL-CCNC: 85 U/L (ref 30–99)
ALT SERPL-CCNC: 49 U/L (ref 2–50)
ANION GAP SERPL CALC-SCNC: 11 MMOL/L (ref 7–16)
ANISOCYTOSIS BLD QL SMEAR: ABNORMAL
APTT PPP: 28.7 SEC (ref 24.7–36)
AST SERPL-CCNC: 31 U/L (ref 12–45)
BASOPHILS # BLD AUTO: 0.9 % (ref 0–1.8)
BASOPHILS # BLD: 0.08 K/UL (ref 0–0.12)
BILIRUB SERPL-MCNC: 0.2 MG/DL (ref 0.1–1.5)
BUN SERPL-MCNC: 7 MG/DL (ref 8–22)
CALCIUM ALBUM COR SERPL-MCNC: 9.7 MG/DL (ref 8.5–10.5)
CALCIUM SERPL-MCNC: 9.8 MG/DL (ref 8.5–10.5)
CHLORIDE SERPL-SCNC: 105 MMOL/L (ref 96–112)
CO2 SERPL-SCNC: 26 MMOL/L (ref 20–33)
CREAT SERPL-MCNC: 0.73 MG/DL (ref 0.5–1.4)
EKG IMPRESSION: NORMAL
EOSINOPHIL # BLD AUTO: 0.08 K/UL (ref 0–0.51)
EOSINOPHIL NFR BLD: 0.9 % (ref 0–6.9)
ERYTHROCYTE [DISTWIDTH] IN BLOOD BY AUTOMATED COUNT: 42.5 FL (ref 35.9–50)
FLUAV RNA SPEC QL NAA+PROBE: NEGATIVE
FLUBV RNA SPEC QL NAA+PROBE: NEGATIVE
GFR SERPLBLD CREATININE-BSD FMLA CKD-EPI: 133 ML/MIN/1.73 M 2
GLOBULIN SER CALC-MCNC: 3.3 G/DL (ref 1.9–3.5)
GLUCOSE SERPL-MCNC: 78 MG/DL (ref 65–99)
HCT VFR BLD AUTO: 44.1 % (ref 42–52)
HGB BLD-MCNC: 14.4 G/DL (ref 14–18)
INR PPP: 0.93 (ref 0.87–1.13)
LYMPHOCYTES # BLD AUTO: 1.82 K/UL (ref 1–4.8)
LYMPHOCYTES NFR BLD: 20.5 % (ref 22–41)
MACROCYTES BLD QL SMEAR: ABNORMAL
MANUAL DIFF BLD: NORMAL
MCH RBC QN AUTO: 28.7 PG (ref 27–33)
MCHC RBC AUTO-ENTMCNC: 32.7 G/DL (ref 32.3–36.5)
MCV RBC AUTO: 87.8 FL (ref 81.4–97.8)
METAMYELOCYTES NFR BLD MANUAL: 0.9 %
MICROCYTES BLD QL SMEAR: ABNORMAL
MONOCYTES # BLD AUTO: 0.48 K/UL (ref 0–0.85)
MONOCYTES NFR BLD AUTO: 5.4 % (ref 0–13.4)
MORPHOLOGY BLD-IMP: NORMAL
MYELOCYTES NFR BLD MANUAL: 0.9 %
NEUTROPHILS # BLD AUTO: 6.27 K/UL (ref 1.82–7.42)
NEUTROPHILS NFR BLD: 70.5 % (ref 44–72)
NRBC # BLD AUTO: 0 K/UL
NRBC BLD-RTO: 0 /100 WBC (ref 0–0.2)
PLATELET # BLD AUTO: 355 K/UL (ref 164–446)
PLATELET BLD QL SMEAR: NORMAL
PMV BLD AUTO: 9.2 FL (ref 9–12.9)
POTASSIUM SERPL-SCNC: 4.3 MMOL/L (ref 3.6–5.5)
PROT SERPL-MCNC: 7.4 G/DL (ref 6–8.2)
PROTHROMBIN TIME: 12.5 SEC (ref 12–14.6)
RBC # BLD AUTO: 5.02 M/UL (ref 4.7–6.1)
RBC BLD AUTO: PRESENT
RSV RNA SPEC QL NAA+PROBE: NEGATIVE
SARS-COV-2 RNA RESP QL NAA+PROBE: NOTDETECTED
SODIUM SERPL-SCNC: 142 MMOL/L (ref 135–145)
WBC # BLD AUTO: 8.9 K/UL (ref 4.8–10.8)

## 2024-12-15 PROCEDURE — 80053 COMPREHEN METABOLIC PANEL: CPT

## 2024-12-15 PROCEDURE — 71275 CT ANGIOGRAPHY CHEST: CPT

## 2024-12-15 PROCEDURE — 0241U HCHG SARS-COV-2 COVID-19 NFCT DS RESP RNA 4 TRGT ED POC: CPT

## 2024-12-15 PROCEDURE — 93005 ELECTROCARDIOGRAM TRACING: CPT | Mod: TC

## 2024-12-15 PROCEDURE — 85730 THROMBOPLASTIN TIME PARTIAL: CPT

## 2024-12-15 PROCEDURE — 85610 PROTHROMBIN TIME: CPT

## 2024-12-15 PROCEDURE — 36415 COLL VENOUS BLD VENIPUNCTURE: CPT

## 2024-12-15 PROCEDURE — 85007 BL SMEAR W/DIFF WBC COUNT: CPT

## 2024-12-15 PROCEDURE — 700117 HCHG RX CONTRAST REV CODE 255: Performed by: EMERGENCY MEDICINE

## 2024-12-15 PROCEDURE — 99283 EMERGENCY DEPT VISIT LOW MDM: CPT

## 2024-12-15 PROCEDURE — 93005 ELECTROCARDIOGRAM TRACING: CPT | Mod: TC | Performed by: EMERGENCY MEDICINE

## 2024-12-15 PROCEDURE — 85027 COMPLETE CBC AUTOMATED: CPT

## 2024-12-15 RX ADMIN — IOHEXOL 55 ML: 350 INJECTION, SOLUTION INTRAVENOUS at 18:45

## 2024-12-15 ASSESSMENT — PAIN DESCRIPTION - PAIN TYPE: TYPE: ACUTE PAIN

## 2024-12-15 ASSESSMENT — FIBROSIS 4 INDEX: FIB4 SCORE: 0.28

## 2024-12-16 NOTE — ED TRIAGE NOTES
Chief Complaint   Patient presents with    Shortness of Breath    Cough     Patient ambulatory to triage with above complaint. States he was diagnosed with lung CA in [place] a year ago. Reports that he is having rib pain and lung pain. 6/10 Report blood in sputum when he coughs. States has been seen here for this complaint.  Previous notes have no mention of lung CA

## 2024-12-16 NOTE — ED PROVIDER NOTES
"ED Provider Note    CHIEF COMPLAINT  Chief Complaint   Patient presents with    Shortness of Breath    Cough       EXTERNAL RECORDS REVIEWED  Last chest x-ray in July of this year in the ED unremarkable    HPI/ROS  LIMITATION TO HISTORY   Select: : None  OUTSIDE HISTORIAN(S):  None    Montana Bone is a 19 y.o. male who presents to the ER for bilateral lower rib pain, cough with bloody sputum, and dyspnea on exertion.  Patient has been having the symptoms for at least past few days.  Reportedly a year ago was told he might have lung cancer but this was never worked up.  He does smoke 8 cigarettes a day and marijuana.  Reports unintentional weight loss over the past few months.      PAST MEDICAL HISTORY   has a past medical history of ADHD.    SURGICAL HISTORY  patient denies any surgical history    FAMILY HISTORY  History reviewed. No pertinent family history.    SOCIAL HISTORY  Social History     Tobacco Use    Smoking status: Every Day     Types: Cigarettes    Smokeless tobacco: Never   Vaping Use    Vaping status: Every Day   Substance and Sexual Activity    Alcohol use: Yes     Comment: occ.    Drug use: Never    Sexual activity: Not on file       CURRENT MEDICATIONS  Home Medications       Reviewed by Tracy Ledbetter R.N. (Registered Nurse) on 12/15/24 at 1645  Med List Status: Partial     Medication Last Dose Status   albuterol 108 (90 Base) MCG/ACT Aero Soln inhalation aerosol  Active   benzonatate (TESSALON) 200 MG capsule  Active                    ALLERGIES  No Known Allergies    PHYSICAL EXAM  VITAL SIGNS: /58   Pulse 86   Temp 36.3 °C (97.4 °F) (Temporal)   Resp 16   Ht 1.905 m (6' 3\")   Wt 103 kg (227 lb 15.3 oz)   SpO2 95%   BMI 28.49 kg/m²    General: Laying calmly in stretcher, no distress  CV: Tachycardic, no murmurs  MSK: No swelling of the lower extremities  Abdomen: Soft NTND  Pulmonary: CTAB  Chest: Tender to palpation in the lower ribs bilaterally    EKG/LABS  EKG at " 1636 sinus tachycardia rate 103, normal axis, normal intervals, benign early repolarization abnormality, no T wave inversions or STEMI.  I have independently interpreted this EKG  CBC with normal cell counts no leukocytosis or anemia.  CMP within normal limits.  COVID flu RSV negative.    RADIOLOGY/PROCEDURES   I have independently interpreted the diagnostic imaging associated with this visit and am waiting the final reading from the radiologist.   My preliminary interpretation is as follows: No PE, no sign of malignancy or pneumonia    Radiologist interpretation:  CT-CTA CHEST PULMONARY ARTERY W/ RECONS   Final Result      1.  No pulmonary embolus. No acute abnormality in the chest. No evidence of malignancy or metastatic disease.   2.  Splenomegaly.             COURSE & MEDICAL DECISION MAKING    ASSESSMENT, COURSE AND PLAN  Care Narrative: Differential includes bronchitis, viral syndrome, pneumonia, pleural effusion, CHF, myocarditis, PE, malignancy    On arrival patient is well-appearing.  Is tachycardic but normal blood pressure.  Afebrile and breathing comfortably on room air.  Normal cardiopulmonary exam on physical exam, no signs of fluid overload to suggest CHF.  Does have some tenderness throughout the lower rib cage in the anterior bilateral areas.  Differential above considered.  No acute interventions needed at this time.  EKG was reassuring, no signs of ischemia or pericarditis, no findings to suggest myocarditis.  Will get labs and CT angio of the chest.  CBC showed no leukocytosis, normal differential, less concern for serious infection or blood dyscrasia.  No anemia to explain symptoms.  Flu COVID RSV swab was negative may have other viral syndrome.  CMP unremarkable.  CT showed no evidence of pulmonary embolism and showed clear lung parenchyma, no pneumonias, no signs of malignancy.  Patient was reassured by the findings today.  Likely has a viral syndrome, bronchitis.  Return precautions provided  discharged home in stable condition      DISPOSITION AND DISCUSSIONS    Escalation of care considered, and ultimately not performed:acute inpatient care management, however at this time, the patient is most appropriate for outpatient management    Barriers to care at this time, including but not limited to: N/A.     Decision tools and prescription drugs considered including, but not limited to: None needed.    FINAL DIAGNOSIS  1. Dyspnea, unspecified type         Electronically signed by: Anand Robertson M.D., 12/15/2024 5:12 PM

## 2024-12-16 NOTE — ED NOTES
"    [facility]   Intensive Care Unit Daily Note    Name: \"Reynaldo\"  (Male-Emperatriz Broussard)  Parents: Emperatriz Broussard and Data Unavailable  YOB: 2020    History of Present Illness   , appropriate for gestational age, Gestational Age: born at 24 weeks 5/7days, male infant born by STAT . Our team was asked by Dr. Samy Bruce of [facility] to care for this infant born at [facility].     Due to prematurity with free air noted on CXR on DOL 11, we were contacted to transport this infant to [facility]NICU for further evaluation and therapy (see transport note for details).     For details of outside hospital course, see the bottom of this note.    Patient Active Problem List   Diagnosis     Free intraperitoneal air     Prematurity, 750-999 grams, 25-26 completed weeks     Need for observation and evaluation of  for sepsis     Malnutrition (H)     IVH (intraventricular hemorrhage) (H)     Perforation bowel (H)        Interval History   Exploratory laparotomy with findings of multiple areas of bowel ischemia and perforation requiring resection of 16.5cm ileum with creation of ileostomy and mucous fistula. Well tolerated.        Assessment & Plan   Overall Status:  13 day old  ELBW male infant who is now 26w4d PMA.   This patient is critically ill with respiratory failure requiring mechanical ventilation support.      Vascular Access:  PIV  PICC - no longer central. Will retract to mid-humeral and treat as peripheral. Prefer not to replace until bloodstream infection cleared.    FEN:    Vitals:    12/10/19 1700 19 0000   Weight: 0.74 kg (1 lb 10.1 oz) 0.9 kg (1 lb 15.8 oz)     Weight change: 0.16 kg (5.7 oz)  22% change from BW    Malnutrition.    Intake 236 ml/kg/d; 78kcal/kg/d, ~ at fluid goal with adequate UO (5.6); no stool.     - Continue NPO (plan for ~14d NPO); Continue sTPN/SMOF today. Decrease GIR to " Pt ambulated to bathroom. Vs stable. No acute distress noted. Call light in place. Labs sent. Covid running.   10, max acetate. Review with Pharm D.   - TF goal 200 ml/kg/day (includes PAL fluids). Monitor fluid status and TPN labs.  - Follow electrolytes Q12h, TPN labs, review with PharmD  - Review with dietician and lactation specialists - see separate notes.     GI: Transferred for findings of free intra-abdominal air on XR, likely secondary to NEC. Now s/p exploratory laparotomy, resection of 16.5cm ileum and creation of ileostomy/mucous fistula on 12/10. Tolerated procedure well, and has remained hemodynamically stable.  - Plan for NPO minimum 14d, pending return of bowel function.   - Replogle to LIS    Renal: History of CAROL, potentially secondary to PDA. Peak creatinine prior to transfer 1.83. Now clearing. Concern for possible renal vein thrombosis with pink-tinged urine and inability to visualize R renal vein at [facility]. Repeat renal ultrasound 12/11 with patent renal veins bilaterally, but echogenic kidneys. Continue to follow Qday.  Creatinine   Date Value Ref Range Status   2019 1.01 0.33 - 1.01 mg/dL Final   - Urine no longer pink-tinged.     Respiratory:  Ongoing failure secondary to prematurity and RDS. Received surfactant x 2 at outside hospital. Currently on SIMV, transitioned to PCPS d/t leak and hypercapnea.     FiO2 (%): 25 %  Resp: 50  Ventilation Mode: SPCPS  Rate Set (breaths/minute): 50 breaths/min  Tidal Volume Set (mL): 4.5 mL  PEEP (cm H2O): 5 cmH2O  Pressure Support (cm H2O): 10 cmH2O  Oxygen Concentration (%): 24 %  Inspiratory Pressure Set (cm H2O): 16 (PIP 21)  Inspiratory Time (seconds): 0.3 sec    - Wean as tolerates.  - Q12h ABG + PRN with changes.   - Continue routine CR monitoring.    Recent Labs   Lab 12/12/19  0620 12/12/19  0001 12/11/19  1940 12/11/19  1820   PH 7.26* 7.30* 7.23* 7.15*   PCO2 52* 47* 50* 65*   PO2 55* 66* 68* 68*   HCO3 23 23 21 23   O2PER 25 24 28 29      Apnea of Prematurity:  No/Minimal ABDS.   - Continue caffeine until ~33-34 weeks PMA.        Cardiovascular:  Currently hemodynamically stable, not requiring pressors. During operative case, he briefly required dopamine during surgery. Has been on maintenance hydrocortisone for suspected adrenal insufficiency. Echo obtained 12/7 with findings of stretched PFO vs ASD, small mid-muscular VSD and moderate PDA.  - Goal mBP > 30.  - Continue routine CR monitoring.    >PDA: Noted at outside hospital, previously described as moderate. Tylenol started 12/7. Repeat echo 12/11 demonstrates similar findings with moderate PDA and holodiastolic runoff, mild LA enlargement.   - Continue Tylenol; repeat echocardiogram 12/13.   - If no change in PDA, consider surgical ligation vs. device occlusion.   - Murmur not appreciated on 12/12 exam.    Endocrine: Suspected adrenal insufficiency, loaded with hydrocortisone and continued on maintenance at outside hospital.   - Prior to surgery, stress dosed with hydrocortisone.  - Currently on 2mg/kg/d.     -- Wean as able; Decrease to 1.5mg/kg/d     ID:  Blood culture positive 12/10 for ESBL Klebsiella in the context of Necrotizing enterocolitis. Repeat culture 12/11 also positive.   - Antibiotics (Vanc/Ceftaz) started 12/10 prior to transfer. Broadened to include Flagyl and Micafungin on transfer to [facility]. CRP markedly elevated: 134->141->185.    - Currently on Vanco/Meropenem/Flagyl/Johanne (broadened to Meropenem 12/11 for ESBL enterobacter)  - Continue daily blood cultures until negative.   - Continue to trend CRP daily  - Attempt LP 12/12.   - Plan for 14 days antibiotics from first negative culture.    - MRSA swab weekly q Sunday    Hematology:    > Risk for anemia of prematurity and phlebotomy.    - plan for iron supplementation when tolerating full feeds.  - Monitor serial hemoglobin levels.   - Transfuse as needed w goal Hgb >13.  Recent Labs   Lab 12/12/19  0620 12/11/19  1820 12/11/19  0545 12/10/19  2145 12/10/19  1755   HGB 14.7 14.5 12.7 11.4 12.4     >Coagulopathy:  S/p FFP x 2 intraoperatively. Most recent INR 1.44.   - Recheck coags PRN.     >Thrombocytopenia: Plt goal >100k for 24hrs post-op. Has received platelet transfusion x 3 since transfer. Will decrease transfusion threshold to 50k .     Hyperbilirubinemia: Physiologic, Phototherapy not indicated.   - Monitor serial bilirubin levels.   - Consider phototherapy for bili > 5   Bilirubin results:  Recent Labs   Lab Test 19  0545 12/10/19  1800   BILITOTAL 1.8 1.7   DBIL 1.1* 1.2*     No results for input(s): TCBIL in the last 168 hours.  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    - Repeat ultrasound  with similar findings to outside US.   - Daily OFC/weekly HUS.   - Given ventriculomegaly, will involve neurosurgery early.     Sedation/ Pain Control:  - Fentanyl gtt at 1mcg/kg/hr. Has not required many PRNs.     ROP:  At risk due to prematurity. First exam scheduled with Peds Ophthalmology.    Thermoregulation: Stable with current support.   - Continue to monitor temperature and provide thermal support as indicated.    HCM:   - [place] - [place] are still pending.   - Send repeat NMS at 14 & 30 days old.  - Obtain hearing/CCHD screens PTD.  - Obtain carseat trial PTD.  - Continue standard NICU cares and family education plan.    Immunizations   BW too low for Hep B immunization at <24 hr.  - give Hep B immunization w 2mo immunizations  .There is no immunization history for the selected administration types on file for this patient.     Medications   Current Facility-Administered Medications   Medication     acetaminophen (OFIRMEV) infusion 10 mg     caffeine citrate (CAFCIT) injection 8 mg     cyclopentolate-phenylephrine (CYCLOMYDRYL) 0.2-1 % ophthalmic solution 1 drop     fentaNYL (PF) (SUBLIMAZE) 0.01 mg/mL in D5W 5 mL NICU Low conc infusion     fentaNYL (SUBLIMAZE) bolus from infusion  pump-PEDS 0.74 mcg     [START ON 2020] hepatitis b vaccine recombinant (ENGERIX-B) injection 10 mcg     hydrocortisone sodium succinate 0.38 mg in NS injection PEDS/NICU     lipids 4 oil (SMOFLIPID) 20% for neonates (Daily dose divided into 2 doses - each infused over 10 hours)     LORazepam (ATIVAN) injection 0.04 mg     meropenem (MERREM) 15 mg in sodium chloride 0.9 % injection PEDS/NICU     metroNIDAZOLE (FLAGYL) injection PEDS/NICU 5.55 mg     micafungin (MYCAMINE) 6 mg in sodium chloride 0.9 % 6 mL in NS injection PEDS/NICU     NaCl 0.45 % with heparin 0.5 Units/mL, papaverine 6 mg infusion     naloxone (NARCAN) injection 0.008 mg     parenteral nutrition -  compounded formula     sodium chloride (PF) 0.9% PF flush 1 mL     sodium chloride 0.45% lock flush 0.1-0.2 mL     sodium chloride 0.45% lock flush 0.5 mL     sodium chloride 0.45% lock flush 1 mL     sucrose (SWEET-EASE) solution 0.2-2 mL     tetracaine (PONTOCAINE) 0.5 % ophthalmic solution 1 drop     vancomycin 12.5 mg in D5W injection PEDS/NICU     vancomycin place best - receiving intermittent dosing     vitamin A 50,000 units/mL (27,500 mcg/mL) injection 5,000 Units        Physical Exam - Attending Physician   GENERAL: NAD, male infant.  RESPIRATORY: Chest CTA, no retractions. Intubated, equal breath sounds.   CV: RRR, III/VI mid-systolic murmur.  Strong/sym pulses in UE/LE, good perfusion.   ABDOMEN: soft, +BS, no HSM. Ostomies in LLQ dusky/congested.  CNS: Normal tone for GA. AFOF. MAEE.   Rest of exam unchanged.     Communications   Parents:  Updated after rounds.     PCPs:   Infant PCP: Physician No Ref-Primary  Delivering Provider: Javier Altman  Referring: Samy Bruce MD at [facility]   Admission note routed to all; Dr. Bruce updated via telephone .     Health Care Team:  Patient discussed with the care team.    A/P, imaging studies, laboratory data, medications and family situation reviewed.    Artemio Malagon,  MD    History prior to transfer to [facility]:  Baby transported on DOL 11 for free air.   FEN: Had been on 4ml q3h feeds with TPN/IL. Had early hyperglycemia requiring insulin x4 days.  Renal: Elevated Creatine of 1.85, renal ultrasound obtained on day of transfer.  Respiratory: Intubated in DR, Curosurf given x2. SIMV Rate 60, CG 5.3ml/kg, PEEP 5, PS 8.  CV: History of dopamine needs for first 4 days. Stress dosing hct had been given and was transferred on 0.5mg/kg/day. He did not receive prophylactic indocin. Echo on 12/7/19 showed moderate PDA with mostly left to right shunting. There was a small muscular VSD and small ASD/PFO. He was started on IV tylenol on 12/7.  ID: Concern for culture negative sepsis after birth, Amp and Gent given x1week. Was on Flucon PPX.  GI: Phototherapy stopped on 12/6/19  Skin: Had a right distal leg PIV infiltrate, hydrogel to wound  Neuro: He had HUS x3 most recently showing small bilateral grade IV's IVH on 12/6. Baby had increased BP spikes on DOL 4 and was loaded x1 with Phenobarbital.   Heme: Was transfused with PRBC's x5, most recently on 12/9. Plt count 93k down from 169k on day of transferred. He was transfused given he became pre op.    Access: History of UAC (removed 12/7/19) and UVC (removed 12/6/19). PICC line placed 12/6/19

## 2024-12-16 NOTE — DISCHARGE INSTRUCTIONS
Today your workup was very reassuring.  The CT scan of your chest did not show any sign of lung cancer, blood clots or pneumonia.  Your test for COVID and flu were negative.  You may have a different virus that is causing your symptoms.  If things are getting worse please come back to the ER otherwise follow-up with a primary doctor.

## 2025-05-25 ENCOUNTER — HOSPITAL ENCOUNTER (EMERGENCY)
Facility: MEDICAL CENTER | Age: 20
End: 2025-05-25
Attending: EMERGENCY MEDICINE
Payer: MEDICAID

## 2025-05-25 ENCOUNTER — APPOINTMENT (OUTPATIENT)
Dept: RADIOLOGY | Facility: MEDICAL CENTER | Age: 20
End: 2025-05-25
Attending: EMERGENCY MEDICINE
Payer: MEDICAID

## 2025-05-25 ENCOUNTER — PHARMACY VISIT (OUTPATIENT)
Dept: PHARMACY | Facility: MEDICAL CENTER | Age: 20
End: 2025-05-25
Payer: COMMERCIAL

## 2025-05-25 VITALS
DIASTOLIC BLOOD PRESSURE: 59 MMHG | SYSTOLIC BLOOD PRESSURE: 130 MMHG | BODY MASS INDEX: 28.35 KG/M2 | WEIGHT: 228 LBS | TEMPERATURE: 98.9 F | RESPIRATION RATE: 16 BRPM | HEIGHT: 75 IN | OXYGEN SATURATION: 94 % | HEART RATE: 71 BPM

## 2025-05-25 DIAGNOSIS — N30.01 ACUTE HEMORRHAGIC CYSTITIS: ICD-10-CM

## 2025-05-25 DIAGNOSIS — J02.0 STREP THROAT: Primary | ICD-10-CM

## 2025-05-25 DIAGNOSIS — E86.0 DEHYDRATION: ICD-10-CM

## 2025-05-25 LAB
ALBUMIN SERPL BCP-MCNC: 4.2 G/DL (ref 3.2–4.9)
ALBUMIN/GLOB SERPL: 1.2 G/DL
ALP SERPL-CCNC: 84 U/L (ref 30–99)
ALT SERPL-CCNC: 25 U/L (ref 2–50)
ANION GAP SERPL CALC-SCNC: 13 MMOL/L (ref 7–16)
APPEARANCE UR: ABNORMAL
AST SERPL-CCNC: 21 U/L (ref 12–45)
BACTERIA #/AREA URNS HPF: ABNORMAL /HPF
BASOPHILS # BLD AUTO: 0.2 % (ref 0–1.8)
BASOPHILS # BLD: 0.03 K/UL (ref 0–0.12)
BILIRUB SERPL-MCNC: 0.9 MG/DL (ref 0.1–1.5)
BILIRUB UR QL STRIP.AUTO: ABNORMAL
BUN SERPL-MCNC: 9 MG/DL (ref 8–22)
CALCIUM ALBUM COR SERPL-MCNC: 9.2 MG/DL (ref 8.5–10.5)
CALCIUM SERPL-MCNC: 9.4 MG/DL (ref 8.5–10.5)
CASTS URNS QL MICRO: ABNORMAL /LPF (ref 0–2)
CHLORIDE SERPL-SCNC: 97 MMOL/L (ref 96–112)
CK SERPL-CCNC: 122 U/L (ref 0–154)
CO2 SERPL-SCNC: 22 MMOL/L (ref 20–33)
COLOR UR: ABNORMAL
CREAT SERPL-MCNC: 1.07 MG/DL (ref 0.5–1.4)
EOSINOPHIL # BLD AUTO: 0 K/UL (ref 0–0.51)
EOSINOPHIL NFR BLD: 0 % (ref 0–6.9)
EPITHELIAL CELLS 1715: ABNORMAL /HPF (ref 0–5)
ERYTHROCYTE [DISTWIDTH] IN BLOOD BY AUTOMATED COUNT: 38.5 FL (ref 35.9–50)
GFR SERPLBLD CREATININE-BSD FMLA CKD-EPI: 101 ML/MIN/1.73 M 2
GLOBULIN SER CALC-MCNC: 3.6 G/DL (ref 1.9–3.5)
GLUCOSE SERPL-MCNC: 105 MG/DL (ref 65–99)
GLUCOSE UR STRIP.AUTO-MCNC: NEGATIVE MG/DL
HCT VFR BLD AUTO: 43.4 % (ref 42–52)
HETEROPH AB SER QL: NEGATIVE
HGB BLD-MCNC: 15.3 G/DL (ref 14–18)
IMM GRANULOCYTES # BLD AUTO: 0.15 K/UL (ref 0–0.11)
IMM GRANULOCYTES NFR BLD AUTO: 0.9 % (ref 0–0.9)
KETONES UR STRIP.AUTO-MCNC: ABNORMAL MG/DL
LACTATE SERPL-SCNC: 1.1 MMOL/L (ref 0.5–2)
LACTATE SERPL-SCNC: 2.4 MMOL/L (ref 0.5–2)
LEUKOCYTE ESTERASE UR QL STRIP.AUTO: ABNORMAL
LIPASE SERPL-CCNC: 31 U/L (ref 11–82)
LYMPHOCYTES # BLD AUTO: 1.55 K/UL (ref 1–4.8)
LYMPHOCYTES NFR BLD: 9.3 % (ref 22–41)
MCH RBC QN AUTO: 30.7 PG (ref 27–33)
MCHC RBC AUTO-ENTMCNC: 35.3 G/DL (ref 32.3–36.5)
MCV RBC AUTO: 87.1 FL (ref 81.4–97.8)
MICRO URNS: ABNORMAL
MONOCYTES # BLD AUTO: 1.13 K/UL (ref 0–0.85)
MONOCYTES NFR BLD AUTO: 6.8 % (ref 0–13.4)
NEUTROPHILS # BLD AUTO: 13.84 K/UL (ref 1.82–7.42)
NEUTROPHILS NFR BLD: 82.8 % (ref 44–72)
NITRITE UR QL STRIP.AUTO: POSITIVE
NRBC # BLD AUTO: 0 K/UL
NRBC BLD-RTO: 0 /100 WBC (ref 0–0.2)
PH UR STRIP.AUTO: 6.5 [PH] (ref 5–8)
PLATELET # BLD AUTO: 251 K/UL (ref 164–446)
PMV BLD AUTO: 9.6 FL (ref 9–12.9)
POTASSIUM SERPL-SCNC: 4 MMOL/L (ref 3.6–5.5)
PROT SERPL-MCNC: 7.8 G/DL (ref 6–8.2)
PROT UR QL STRIP: >=300 MG/DL
RBC # BLD AUTO: 4.98 M/UL (ref 4.7–6.1)
RBC # URNS HPF: >100 /HPF (ref 0–2)
RBC UR QL AUTO: ABNORMAL
S PYO DNA SPEC NAA+PROBE: DETECTED
SODIUM SERPL-SCNC: 132 MMOL/L (ref 135–145)
SP GR UR STRIP.AUTO: 1.01
UROBILINOGEN UR STRIP.AUTO-MCNC: 2 EU/DL
WBC # BLD AUTO: 16.7 K/UL (ref 4.8–10.8)
WBC #/AREA URNS HPF: ABNORMAL /HPF

## 2025-05-25 PROCEDURE — 96361 HYDRATE IV INFUSION ADD-ON: CPT

## 2025-05-25 PROCEDURE — 700102 HCHG RX REV CODE 250 W/ 637 OVERRIDE(OP): Mod: UD | Performed by: EMERGENCY MEDICINE

## 2025-05-25 PROCEDURE — 700111 HCHG RX REV CODE 636 W/ 250 OVERRIDE (IP): Mod: JZ | Performed by: EMERGENCY MEDICINE

## 2025-05-25 PROCEDURE — 99284 EMERGENCY DEPT VISIT MOD MDM: CPT

## 2025-05-25 PROCEDURE — 86308 HETEROPHILE ANTIBODY SCREEN: CPT

## 2025-05-25 PROCEDURE — 85025 COMPLETE CBC W/AUTO DIFF WBC: CPT

## 2025-05-25 PROCEDURE — 96375 TX/PRO/DX INJ NEW DRUG ADDON: CPT

## 2025-05-25 PROCEDURE — 700105 HCHG RX REV CODE 258: Performed by: EMERGENCY MEDICINE

## 2025-05-25 PROCEDURE — 87040 BLOOD CULTURE FOR BACTERIA: CPT

## 2025-05-25 PROCEDURE — 81001 URINALYSIS AUTO W/SCOPE: CPT

## 2025-05-25 PROCEDURE — 36415 COLL VENOUS BLD VENIPUNCTURE: CPT

## 2025-05-25 PROCEDURE — 96374 THER/PROPH/DIAG INJ IV PUSH: CPT

## 2025-05-25 PROCEDURE — 83690 ASSAY OF LIPASE: CPT

## 2025-05-25 PROCEDURE — 71045 X-RAY EXAM CHEST 1 VIEW: CPT

## 2025-05-25 PROCEDURE — RXMED WILLOW AMBULATORY MEDICATION CHARGE: Performed by: EMERGENCY MEDICINE

## 2025-05-25 PROCEDURE — 82550 ASSAY OF CK (CPK): CPT

## 2025-05-25 PROCEDURE — 87651 STREP A DNA AMP PROBE: CPT

## 2025-05-25 PROCEDURE — 83605 ASSAY OF LACTIC ACID: CPT

## 2025-05-25 PROCEDURE — A9270 NON-COVERED ITEM OR SERVICE: HCPCS | Mod: UD | Performed by: EMERGENCY MEDICINE

## 2025-05-25 PROCEDURE — 80053 COMPREHEN METABOLIC PANEL: CPT

## 2025-05-25 RX ORDER — ONDANSETRON 2 MG/ML
4 INJECTION INTRAMUSCULAR; INTRAVENOUS ONCE
Status: COMPLETED | OUTPATIENT
Start: 2025-05-25 | End: 2025-05-25

## 2025-05-25 RX ORDER — KETOROLAC TROMETHAMINE 15 MG/ML
15 INJECTION, SOLUTION INTRAMUSCULAR; INTRAVENOUS ONCE
Status: COMPLETED | OUTPATIENT
Start: 2025-05-25 | End: 2025-05-25

## 2025-05-25 RX ORDER — CEFTRIAXONE 1 G/1
1000 INJECTION, POWDER, FOR SOLUTION INTRAMUSCULAR; INTRAVENOUS ONCE
Status: COMPLETED | OUTPATIENT
Start: 2025-05-25 | End: 2025-05-25

## 2025-05-25 RX ORDER — ACETAMINOPHEN 325 MG/1
650 TABLET ORAL ONCE
Status: COMPLETED | OUTPATIENT
Start: 2025-05-25 | End: 2025-05-25

## 2025-05-25 RX ORDER — DEXAMETHASONE SODIUM PHOSPHATE 4 MG/ML
12 INJECTION, SOLUTION INTRA-ARTICULAR; INTRALESIONAL; INTRAMUSCULAR; INTRAVENOUS; SOFT TISSUE ONCE
Status: COMPLETED | OUTPATIENT
Start: 2025-05-25 | End: 2025-05-25

## 2025-05-25 RX ORDER — SODIUM CHLORIDE 9 MG/ML
1000 INJECTION, SOLUTION INTRAVENOUS ONCE
Status: COMPLETED | OUTPATIENT
Start: 2025-05-25 | End: 2025-05-25

## 2025-05-25 RX ORDER — CEFDINIR 300 MG/1
300 CAPSULE ORAL 2 TIMES DAILY
Qty: 14 CAPSULE | Refills: 0 | Status: ACTIVE | OUTPATIENT
Start: 2025-05-25 | End: 2025-06-01

## 2025-05-25 RX ADMIN — ACETAMINOPHEN 650 MG: 325 TABLET ORAL at 17:56

## 2025-05-25 RX ADMIN — ONDANSETRON 4 MG: 2 INJECTION INTRAMUSCULAR; INTRAVENOUS at 14:59

## 2025-05-25 RX ADMIN — KETOROLAC TROMETHAMINE 15 MG: 15 INJECTION, SOLUTION INTRAMUSCULAR; INTRAVENOUS at 17:46

## 2025-05-25 RX ADMIN — SODIUM CHLORIDE 1000 ML: 9 INJECTION, SOLUTION INTRAVENOUS at 14:57

## 2025-05-25 RX ADMIN — DEXAMETHASONE SODIUM PHOSPHATE 12 MG: 4 INJECTION INTRA-ARTICULAR; INTRALESIONAL; INTRAMUSCULAR; INTRAVENOUS; SOFT TISSUE at 15:01

## 2025-05-25 RX ADMIN — SODIUM CHLORIDE 1000 ML: 9 INJECTION, SOLUTION INTRAVENOUS at 16:05

## 2025-05-25 RX ADMIN — FENTANYL CITRATE 50 MCG: 50 INJECTION, SOLUTION INTRAMUSCULAR; INTRAVENOUS at 15:00

## 2025-05-25 RX ADMIN — CEFTRIAXONE SODIUM 1000 MG: 1 INJECTION, POWDER, FOR SOLUTION INTRAMUSCULAR; INTRAVENOUS at 16:04

## 2025-05-25 ASSESSMENT — PAIN DESCRIPTION - PAIN TYPE
TYPE: ACUTE PAIN
TYPE: OTHER (COMMENT);ACUTE PAIN
TYPE: ACUTE PAIN

## 2025-05-25 ASSESSMENT — FIBROSIS 4 INDEX: FIB4 SCORE: 0.26

## 2025-05-25 NOTE — ED TRIAGE NOTES
Chief Complaint   Patient presents with    Sore Throat    Blood in Urine     Pt reports blood in urine today. Pt reports hx of kidney issues due to not drinking enough water.     Flank Pain     Bilat.    Low Back Pain     Explained to pt triage process, made pt aware to tell this RN/staff of any changes/concerns, pt verbalized understanding of process and instructions given. Pt to ER lobby.

## 2025-05-25 NOTE — ED PROVIDER NOTES
"ED Provider Note    CHIEF COMPLAINT  Chief Complaint   Patient presents with    Sore Throat    Blood in Urine     Pt reports blood in urine today. Pt reports hx of kidney issues due to not drinking enough water.     Flank Pain     Bilat.    Low Back Pain     EXTERNAL RECORDS REVIEWED    Patient's last encounter was an ED visit December 15 of last year he was seen for dyspnea.  He had extensive lab evaluation as well as a CT scan of his chest.    Patient also has an encounter from the [facility] popup clinic.  Patient was seen at the time for sores on his and paresthesias.    In September of last year patient was also seen in the emergency department for bilateral foot pain diagnosed with foot callouses.    HPI/ROS  LIMITATION TO HISTORY   Select: : None  OUTSIDE HISTORIAN(S):  Significant other family    Montana Bone is a 20 y.o. male who presents to the emergency department with a chief complaint of dark urine.  He states this started just today.  He has had bodyaches for days and a sore throat for a week.  They do not have a thermometer, but family members at bedside, noted that he was \"burning up last night\".  He does not regularly take Tylenol or ibuprofen.  He is not on any anticoagulants.  He denies any other past medical history although he states he has had \"problems with his kidneys in the past related to dehydration\".  He is complaining of nausea but no vomiting.  No diarrhea.  He does have burning with urination.  Difficulty talking secondary to a sore throat as well as difficulty swallowing.    PAST MEDICAL HISTORY   has a past medical history of ADHD.    SURGICAL HISTORY  patient denies any surgical history    FAMILY HISTORY  No family history on file.    SOCIAL HISTORY  Social History     Tobacco Use    Smoking status: Every Day     Types: Cigarettes    Smokeless tobacco: Never   Vaping Use    Vaping status: Every Day   Substance and Sexual Activity    Alcohol use: Yes     Comment: occ. " "   Drug use: Never    Sexual activity: Not on file       CURRENT MEDICATIONS  Home Medications    **Home medications have not yet been reviewed for this encounter**       ALLERGIES  Allergies[1]    PHYSICAL EXAM  VITAL SIGNS: /59   Pulse 71   Temp 37.2 °C (98.9 °F) (Oral)   Resp 16   Ht 1.905 m (6' 3\")   Wt 103 kg (228 lb)   SpO2 94%   BMI 28.50 kg/m²    Vitals reviewed.  Constitutional: Patient is oriented to person, place, and time.  Appears uncomfortable and mildly ill.    Head: Normocephalic and atraumatic.   Ears: Normal external ears bilaterally.   Mouth/Throat: Bilateral tonsillar pillars are edematous, erythematous with exudates.  They are symmetric in appearance.  There is no uvular shift.  Eyes: Conjunctivae are normal. Pupils are equal, round, and reactive to light.   Neck: Normal range of motion. Neck supple. No tracheal deviation present.  No meningeal signs.  Cardiovascular: Tachycardia, regular rhythm and normal heart sounds. Normal peripheral pulses.  Pulmonary/Chest: Effort normal and breath sounds normal. No respiratory distress, no wheezes, rhonchi, or rales.  Abdominal: Soft. Bowel sounds are normal. There is no tenderness, rebound or guarding, or peritoneal signs. No CVA tenderness.  Musculoskeletal: No edema.  Diffuse myalgias.  Lymphadenopathy: Anterior, submandibular and posterior cervical adenopathy.   Neurological: No cranial nerve deficits. Normal motor and sensory exam. No focal deficits.   Skin: Skin is warm and dry. No erythema. No pallor.   Psychiatric: Patient has a normal mood and affect.     EKG/LABS  Results for orders placed or performed during the hospital encounter of 05/25/25   CBC WITH DIFFERENTIAL    Collection Time: 05/25/25  1:35 PM   Result Value Ref Range    WBC 16.7 (H) 4.8 - 10.8 K/uL    RBC 4.98 4.70 - 6.10 M/uL    Hemoglobin 15.3 14.0 - 18.0 g/dL    Hematocrit 43.4 42.0 - 52.0 %    MCV 87.1 81.4 - 97.8 fL    MCH 30.7 27.0 - 33.0 pg    MCHC 35.3 32.3 - " 36.5 g/dL    RDW 38.5 35.9 - 50.0 fL    Platelet Count 251 164 - 446 K/uL    MPV 9.6 9.0 - 12.9 fL    Neutrophils-Polys 82.80 (H) 44.00 - 72.00 %    Lymphocytes 9.30 (L) 22.00 - 41.00 %    Monocytes 6.80 0.00 - 13.40 %    Eosinophils 0.00 0.00 - 6.90 %    Basophils 0.20 0.00 - 1.80 %    Immature Granulocytes 0.90 0.00 - 0.90 %    Nucleated RBC 0.00 0.00 - 0.20 /100 WBC    Neutrophils (Absolute) 13.84 (H) 1.82 - 7.42 K/uL    Lymphs (Absolute) 1.55 1.00 - 4.80 K/uL    Monos (Absolute) 1.13 (H) 0.00 - 0.85 K/uL    Eos (Absolute) 0.00 0.00 - 0.51 K/uL    Baso (Absolute) 0.03 0.00 - 0.12 K/uL    Immature Granulocytes (abs) 0.15 (H) 0.00 - 0.11 K/uL    NRBC (Absolute) 0.00 K/uL   COMP METABOLIC PANEL    Collection Time: 05/25/25  1:35 PM   Result Value Ref Range    Sodium 132 (L) 135 - 145 mmol/L    Potassium 4.0 3.6 - 5.5 mmol/L    Chloride 97 96 - 112 mmol/L    Co2 22 20 - 33 mmol/L    Anion Gap 13.0 7.0 - 16.0    Glucose 105 (H) 65 - 99 mg/dL    Bun 9 8 - 22 mg/dL    Creatinine 1.07 0.50 - 1.40 mg/dL    Calcium 9.4 8.5 - 10.5 mg/dL    Correct Calcium 9.2 8.5 - 10.5 mg/dL    AST(SGOT) 21 12 - 45 U/L    ALT(SGPT) 25 2 - 50 U/L    Alkaline Phosphatase 84 30 - 99 U/L    Total Bilirubin 0.9 0.1 - 1.5 mg/dL    Albumin 4.2 3.2 - 4.9 g/dL    Total Protein 7.8 6.0 - 8.2 g/dL    Globulin 3.6 (H) 1.9 - 3.5 g/dL    A-G Ratio 1.2 g/dL   LIPASE    Collection Time: 05/25/25  1:35 PM   Result Value Ref Range    Lipase 31 11 - 82 U/L   ESTIMATED GFR    Collection Time: 05/25/25  1:35 PM   Result Value Ref Range    GFR (CKD-EPI) 101 >60 mL/min/1.73 m 2   MONONUCLEOSIS TEST QUAL    Collection Time: 05/25/25  1:35 PM   Result Value Ref Range    Heterophile Screen Negative Negative   CREATINE KINASE    Collection Time: 05/25/25  1:35 PM   Result Value Ref Range    CPK Total 122 0 - 154 U/L   URINALYSIS    Collection Time: 05/25/25  2:26 PM    Specimen: Urine   Result Value Ref Range    Color Red (A)     Character Hazy (A)     Specific  Gravity 1.010 <1.035    Ph 6.5 5.0 - 8.0    Glucose Negative Negative mg/dL    Ketones Trace (A) Negative mg/dL    Protein >=300 (A) Negative mg/dL    Bilirubin Small (A) Negative    Urobilinogen, Urine 2.0 (A) <=1.0 EU/dL    Nitrite Positive (A) Negative    Leukocyte Esterase Trace (A) Negative    Occult Blood Large (A) Negative    Micro Urine Req Microscopic    URINE MICROSCOPIC (W/UA)    Collection Time: 05/25/25  2:26 PM   Result Value Ref Range    WBC 3-5 (A) /hpf    RBC >100 (A) 0 - 2 /hpf    Bacteria Few (A) None /hpf    Epithelial Cells 0-2 0 - 5 /hpf    Urine Casts 0-2 0 - 2 /lpf   Group A Strep by PCR    Collection Time: 05/25/25  3:00 PM    Specimen: Throat   Result Value Ref Range    Group A Strep by PCR DETECTED (A) Not Detected   Lactic Acid    Collection Time: 05/25/25  3:00 PM   Result Value Ref Range    Lactic Acid 2.4 (H) 0.5 - 2.0 mmol/L   Blood Culture - Draw one from central line and one from peripheral site    Collection Time: 05/25/25  3:00 PM    Specimen: Line; Blood   Result Value Ref Range    Significant Indicator NEG     Source BLD     Site Peripheral     Culture Result       No Growth  Note: Blood cultures are incubated for 5 days and  are monitored continuously.Positive blood cultures  are called to the RN and reported as soon as  they are identified.     Blood Culture - Draw one from central line and one from peripheral site    Collection Time: 05/25/25  3:10 PM    Specimen: Peripheral; Blood   Result Value Ref Range    Significant Indicator NEG     Source BLD     Site PERIPHERAL     Culture Result       No Growth  Note: Blood cultures are incubated for 5 days and  are monitored continuously.Positive blood cultures  are called to the RN and reported as soon as  they are identified.     Lactic Acid    Collection Time: 05/25/25  5:00 PM   Result Value Ref Range    Lactic Acid 1.1 0.5 - 2.0 mmol/L     I have independently interpreted this EKG    RADIOLOGY/PROCEDURES   I have independently  interpreted the diagnostic imaging associated with this visit and am waiting the final reading from the radiologist.   My preliminary interpretation is as follows: [facility]    Radiologist interpretation:  DX-CHEST-PORTABLE (1 VIEW)   Final Result         1. No acute cardiopulmonary abnormalities are identified.          COURSE & MEDICAL DECISION MAKING    ASSESSMENT, COURSE AND PLAN  Care Narrative:     This is a previously healthy 20-year-old.  He presents with 2 family members.  He appears ill.  He is tachycardic.  He has diffuse myalgias.  Complains of a sore throat.  His bilateral tonsillar pillars are edematous and erythematous with bilateral exudates.  Certainly concerning for pharyngitis and, a strep swab was obtained in addition, I have ordered a Monospot.  It does not have the appearance clinically of a peritonsillar abscess.  He does appear clinically dehydrated.  His mucous membranes are dry and he is tachycardic.  For this reason, I have ordered IV fluid resuscitation and added a lactate and blood cultures to his evaluation as well as a Monospot and CPK.  His urine is at the bedside and it is very dark in color, no clots but highly suspicious for hematuria.  He denies taking any dietary supplements or eating anything that could have made his urine this color.    4:20 PM labs reviewed.  Patient does test positive for strep.  He also has findings suggestive of UTI and labs are suggesting dehydration.  He has an elevated lactate.  Will repeat after IV fluid resuscitation.  CPK was normal.  Creatinine normal.    4:40 PM patient is reevaluated at the bedside.  He is advised of above lab results.  IV fluids are infusing he is feeling better.  Await repeat lactate at that time, will make a decision about admission versus discharge.    6:39 PM patient is reevaluated at the bedside.  2 L of IV fluids have infused.  His lactate has normalized.  Patient sitting up at the bedside.  He looks much improved.  He has much  better color.  He reports feeling subjectively significantly better.  He is eager to go home and I think this is a reasonable disposition.  He was given a dose of Rocephin here in the ED.  He is advised he can start his antibiotics tomorrow.  He is given strict return precautions.  He is discharged home with family in stable condition.    Hydration: Based on the patient's presentation of Dehydration, Tachycardia, and Other febrile illness the patient was given IV fluids. IV Hydration was used because oral hydration was not adequate alone. Upon recheck following hydration, the patient was improved.    ADDITIONAL PROBLEMS MANAGED    DISPOSITION AND DISCUSSIONS  I have discussed management of the patient with the following physicians and ELA's:  None    Discussion of management with other QHP or appropriate source(s): None     Escalation of care considered, and ultimately not performed:acute inpatient care management, however at this time, the patient is most appropriate for outpatient management    Barriers to care at this time, including but not limited to: Patient does not have established PCP.     Decision tools and prescription drugs considered including, but not limited to: None.    FINAL DIAGNOSIS  1. Strep throat    2. Dehydration    3. Acute hemorrhagic cystitis         Electronically signed by: Sera Goss D.O., 5/25/2025 2:26 PM           [1] No Known Allergies

## 2025-05-26 NOTE — ED NOTES
Pt provided discharge instructions, discharge ABX and follow-up information. Pt verbalized understanding and all questions answered. PIV removed. Pt ambulated from ED with steady gait carrying all belongings.

## 2025-05-30 LAB
BACTERIA BLD CULT: NORMAL
BACTERIA BLD CULT: NORMAL
SIGNIFICANT IND 70042: NORMAL
SIGNIFICANT IND 70042: NORMAL
SITE SITE: NORMAL
SITE SITE: NORMAL
SOURCE SOURCE: NORMAL
SOURCE SOURCE: NORMAL

## 2025-06-03 ENCOUNTER — OFFICE VISIT (OUTPATIENT)
Dept: URGENT CARE | Facility: CLINIC | Age: 20
End: 2025-06-03
Payer: MEDICAID

## 2025-06-03 ENCOUNTER — PHARMACY VISIT (OUTPATIENT)
Dept: PHARMACY | Facility: MEDICAL CENTER | Age: 20
End: 2025-06-03
Payer: COMMERCIAL

## 2025-06-03 VITALS
OXYGEN SATURATION: 96 % | TEMPERATURE: 97.2 F | HEIGHT: 75 IN | RESPIRATION RATE: 20 BRPM | SYSTOLIC BLOOD PRESSURE: 98 MMHG | WEIGHT: 212.2 LBS | BODY MASS INDEX: 26.38 KG/M2 | DIASTOLIC BLOOD PRESSURE: 60 MMHG | HEART RATE: 107 BPM

## 2025-06-03 DIAGNOSIS — M54.50 LUMBAR PAIN: Primary | ICD-10-CM

## 2025-06-03 PROCEDURE — 99214 OFFICE O/P EST MOD 30 MIN: CPT

## 2025-06-03 PROCEDURE — 3078F DIAST BP <80 MM HG: CPT

## 2025-06-03 PROCEDURE — 3074F SYST BP LT 130 MM HG: CPT

## 2025-06-03 PROCEDURE — RXMED WILLOW AMBULATORY MEDICATION CHARGE

## 2025-06-03 RX ORDER — CYCLOBENZAPRINE HCL 5 MG
5-10 TABLET ORAL
Qty: 15 TABLET | Refills: 0 | Status: SHIPPED | OUTPATIENT
Start: 2025-06-03

## 2025-06-03 RX ORDER — KETOROLAC TROMETHAMINE 15 MG/ML
15 INJECTION, SOLUTION INTRAMUSCULAR; INTRAVENOUS ONCE
Status: COMPLETED | OUTPATIENT
Start: 2025-06-03 | End: 2025-06-03

## 2025-06-03 RX ADMIN — KETOROLAC TROMETHAMINE 15 MG: 15 INJECTION, SOLUTION INTRAMUSCULAR; INTRAVENOUS at 13:53

## 2025-06-03 ASSESSMENT — FIBROSIS 4 INDEX: FIB4 SCORE: 0.35

## 2025-06-03 NOTE — PROGRESS NOTES
"  CHIEF COMPLAINT  Chief Complaint   Patient presents with    Low Back Pain     Muscle px, x3 days.      Subjective:   Montana Bone is a 20 y.o. male who presents urgent care with concerns for low back pain x 3 days.  Patient denies any numbness or tingling.  No radiation of pain.  No saddle pain.  No bowel/bladder abnormalities.  He denies any history of trauma or injury.  No fevers or chills.  Denies attempting to alleviate symptoms with OTC analgesics.        Review of Systems   Constitutional:  Negative for chills and fever.   Musculoskeletal:  Positive for back pain.   Neurological:  Negative for tingling.   Psychiatric/Behavioral:  Negative for substance abuse.        PAST MEDICAL HISTORY  There are no active problems to display for this patient.      SURGICAL HISTORY  patient denies any surgical history    ALLERGIES  Allergies[1]    CURRENT MEDICATIONS  Home Medications       Reviewed by Ramos Lynn'salas (Medical Assistant) on 06/03/25 at 1329  Med List Status: <None>     Medication Last Dose Status   albuterol 108 (90 Base) MCG/ACT Aero Soln inhalation aerosol Not Taking Active   benzonatate (TESSALON) 200 MG capsule Not Taking Active                    SOCIAL HISTORY  Social History     Tobacco Use    Smoking status: Every Day     Types: Cigarettes    Smokeless tobacco: Never   Vaping Use    Vaping status: Every Day    Substances: THC, Flavoring    Devices: Disposable   Substance and Sexual Activity    Alcohol use: Not Currently     Comment: occ.    Drug use: Never    Sexual activity: Not on file       FAMILY HISTORY  History reviewed. No pertinent family history.      Medications, Allergies, and current problem list reviewed today in Epic.     Objective:     BP 98/60 (BP Location: Left arm, Patient Position: Sitting, BP Cuff Size: Adult)   Pulse (!) 107   Temp 36.2 °C (97.2 °F) (Temporal)   Resp 20   Ht 1.905 m (6' 3\")   Wt 96.3 kg (212 lb 3.2 oz)   SpO2 96%     Physical " Exam  Vitals reviewed.   Constitutional:       General: He is not in acute distress.     Appearance: Normal appearance. He is not ill-appearing or toxic-appearing.   HENT:      Head: Normocephalic.      Mouth/Throat:      Mouth: Mucous membranes are moist.      Pharynx: Oropharynx is clear.   Cardiovascular:      Rate and Rhythm: Normal rate.   Pulmonary:      Effort: Pulmonary effort is normal.   Musculoskeletal:         General: Normal range of motion.      Cervical back: Normal.      Thoracic back: Normal.      Lumbar back: Tenderness present. No swelling, deformity or bony tenderness. Normal range of motion. Negative right straight leg raise test and negative left straight leg raise test. No scoliosis.        Back:       Comments: + paraspinal tenderness    Neurological:      General: No focal deficit present.      Mental Status: He is alert.   Psychiatric:         Mood and Affect: Mood normal.         Assessment/Plan:     Diagnosis and associated orders:     1. Lumbar pain  ketorolac (Toradol) 15 MG/ML injection 15 mg    cyclobenzaprine (FLEXERIL) 5 MG tablet         Comments/MDM:     Patient presents urgent care with 3-day history of lower lumbar pain.  No history of trauma.  No fevers or chills.  Has not attempted to alleviate symptoms with OTC analgesics.  Physical exam and history consistent with lower lumbar strain.  No deformity, step-offs or spinal tenderness appreciated.  Vital signs are stable in clinic.  IM Toradol given today in clinic for alleviation of acute pain.  Advised to begin Motrin tomorrow as needed.  May take Tylenol as needed throughout the remainder of today.  Muscle relaxers sent to preferred pharmacy.  Counseled on sedative nature.  Return to clinic if symptoms worsen or fail to resolve.  Patient comfortable with plan.         Differential diagnosis, natural history, supportive care, and indications for immediate follow-up discussed.    Advised the patient to follow-up with the primary  care physician for recheck, reevaluation, and consideration of further management.    Please note that this dictation was created using voice recognition software. I have made a reasonable attempt to correct obvious errors, but I expect that there are errors of grammar and possibly content that I did not discover before finalizing the note.    This note was electronically signed by BRIAN Murray         [1] No Known Allergies

## 2025-06-07 ASSESSMENT — ENCOUNTER SYMPTOMS
FEVER: 0
TINGLING: 0
CHILLS: 0
BACK PAIN: 1

## 2025-06-07 ASSESSMENT — LIFESTYLE VARIABLES: SUBSTANCE_ABUSE: 0
